# Patient Record
Sex: FEMALE | Race: BLACK OR AFRICAN AMERICAN | NOT HISPANIC OR LATINO | Employment: UNEMPLOYED | URBAN - METROPOLITAN AREA
[De-identification: names, ages, dates, MRNs, and addresses within clinical notes are randomized per-mention and may not be internally consistent; named-entity substitution may affect disease eponyms.]

---

## 2024-08-23 ENCOUNTER — APPOINTMENT (OUTPATIENT)
Dept: RADIOLOGY | Facility: HOSPITAL | Age: 69
End: 2024-08-23
Payer: COMMERCIAL

## 2024-08-23 ENCOUNTER — HOSPITAL ENCOUNTER (INPATIENT)
Facility: HOSPITAL | Age: 69
LOS: 2 days | Discharge: HOME | End: 2024-08-25
Attending: EMERGENCY MEDICINE | Admitting: STUDENT IN AN ORGANIZED HEALTH CARE EDUCATION/TRAINING PROGRAM
Payer: COMMERCIAL

## 2024-08-23 ENCOUNTER — CLINICAL SUPPORT (OUTPATIENT)
Dept: EMERGENCY MEDICINE | Facility: HOSPITAL | Age: 69
End: 2024-08-23
Payer: COMMERCIAL

## 2024-08-23 DIAGNOSIS — B96.89 BACTERIAL CONJUNCTIVITIS OF BOTH EYES: ICD-10-CM

## 2024-08-23 DIAGNOSIS — I50.9 ACUTE ON CHRONIC CONGESTIVE HEART FAILURE, UNSPECIFIED HEART FAILURE TYPE (MULTI): Primary | ICD-10-CM

## 2024-08-23 DIAGNOSIS — H10.9 BACTERIAL CONJUNCTIVITIS OF BOTH EYES: ICD-10-CM

## 2024-08-23 LAB
ALBUMIN SERPL BCP-MCNC: 2.7 G/DL (ref 3.4–5)
ALBUMIN SERPL BCP-MCNC: 2.8 G/DL (ref 3.4–5)
ALP SERPL-CCNC: 132 U/L (ref 33–136)
ALT SERPL W P-5'-P-CCNC: 15 U/L (ref 7–45)
ANION GAP SERPL CALC-SCNC: 15 MMOL/L (ref 10–20)
ANION GAP SERPL CALC-SCNC: 17 MMOL/L (ref 10–20)
AST SERPL W P-5'-P-CCNC: 22 U/L (ref 9–39)
ATRIAL RATE: 94 BPM
BASOPHILS # BLD AUTO: 0.02 X10*3/UL (ref 0–0.1)
BASOPHILS NFR BLD AUTO: 0.3 %
BILIRUB SERPL-MCNC: 1.1 MG/DL (ref 0–1.2)
BNP SERPL-MCNC: 535 PG/ML (ref 0–99)
BUN SERPL-MCNC: 12 MG/DL (ref 6–23)
BUN SERPL-MCNC: 12 MG/DL (ref 6–23)
BURR CELLS BLD QL SMEAR: NORMAL
CALCIUM SERPL-MCNC: 8.7 MG/DL (ref 8.6–10.6)
CALCIUM SERPL-MCNC: 8.9 MG/DL (ref 8.6–10.6)
CARDIAC TROPONIN I PNL SERPL HS: 173 NG/L (ref 0–34)
CARDIAC TROPONIN I PNL SERPL HS: 195 NG/L (ref 0–34)
CHLORIDE SERPL-SCNC: 96 MMOL/L (ref 98–107)
CHLORIDE SERPL-SCNC: 96 MMOL/L (ref 98–107)
CO2 SERPL-SCNC: 30 MMOL/L (ref 21–32)
CO2 SERPL-SCNC: 32 MMOL/L (ref 21–32)
CREAT SERPL-MCNC: 0.55 MG/DL (ref 0.5–1.05)
CREAT SERPL-MCNC: 0.6 MG/DL (ref 0.5–1.05)
EGFRCR SERPLBLD CKD-EPI 2021: >90 ML/MIN/1.73M*2
EGFRCR SERPLBLD CKD-EPI 2021: >90 ML/MIN/1.73M*2
EOSINOPHIL # BLD AUTO: 0.05 X10*3/UL (ref 0–0.7)
EOSINOPHIL NFR BLD AUTO: 0.8 %
ERYTHROCYTE [DISTWIDTH] IN BLOOD BY AUTOMATED COUNT: 16.4 % (ref 11.5–14.5)
GLUCOSE BLD MANUAL STRIP-MCNC: 165 MG/DL (ref 74–99)
GLUCOSE SERPL-MCNC: 134 MG/DL (ref 74–99)
GLUCOSE SERPL-MCNC: 193 MG/DL (ref 74–99)
HCT VFR BLD AUTO: 51.1 % (ref 36–46)
HGB BLD-MCNC: 17.4 G/DL (ref 12–16)
IMM GRANULOCYTES # BLD AUTO: 0.02 X10*3/UL (ref 0–0.7)
IMM GRANULOCYTES NFR BLD AUTO: 0.3 % (ref 0–0.9)
LYMPHOCYTES # BLD AUTO: 0.91 X10*3/UL (ref 1.2–4.8)
LYMPHOCYTES NFR BLD AUTO: 14.4 %
MAGNESIUM SERPL-MCNC: 1.52 MG/DL (ref 1.6–2.4)
MAGNESIUM SERPL-MCNC: 1.57 MG/DL (ref 1.6–2.4)
MCH RBC QN AUTO: 28.9 PG (ref 26–34)
MCHC RBC AUTO-ENTMCNC: 34.1 G/DL (ref 32–36)
MCV RBC AUTO: 85 FL (ref 80–100)
MONOCYTES # BLD AUTO: 1.01 X10*3/UL (ref 0.1–1)
MONOCYTES NFR BLD AUTO: 16 %
NEUTROPHILS # BLD AUTO: 4.29 X10*3/UL (ref 1.2–7.7)
NEUTROPHILS NFR BLD AUTO: 68.2 %
NRBC BLD-RTO: 0 /100 WBCS (ref 0–0)
P AXIS: 54 DEGREES
P OFFSET: 201 MS
P ONSET: 130 MS
PHOSPHATE SERPL-MCNC: 4.2 MG/DL (ref 2.5–4.9)
PLATELET # BLD AUTO: 194 X10*3/UL (ref 150–450)
POTASSIUM SERPL-SCNC: 3.4 MMOL/L (ref 3.5–5.3)
POTASSIUM SERPL-SCNC: 3.7 MMOL/L (ref 3.5–5.3)
PR INTERVAL: 174 MS
PROCALCITONIN SERPL-MCNC: 0.07 NG/ML
PROT SERPL-MCNC: 6.1 G/DL (ref 6.4–8.2)
Q ONSET: 217 MS
QRS COUNT: 16 BEATS
QRS DURATION: 114 MS
QT INTERVAL: 410 MS
QTC CALCULATION(BAZETT): 512 MS
QTC FREDERICIA: 476 MS
R AXIS: -35 DEGREES
RBC # BLD AUTO: 6.02 X10*6/UL (ref 4–5.2)
RBC MORPH BLD: NORMAL
SARS-COV-2 RNA RESP QL NAA+PROBE: NOT DETECTED
SODIUM SERPL-SCNC: 138 MMOL/L (ref 136–145)
SODIUM SERPL-SCNC: 141 MMOL/L (ref 136–145)
T AXIS: 105 DEGREES
T OFFSET: 422 MS
UFH PPP CHRO-ACNC: 1.2 IU/ML
VENTRICULAR RATE: 94 BPM
WBC # BLD AUTO: 6.3 X10*3/UL (ref 4.4–11.3)

## 2024-08-23 PROCEDURE — 2500000001 HC RX 250 WO HCPCS SELF ADMINISTERED DRUGS (ALT 637 FOR MEDICARE OP)

## 2024-08-23 PROCEDURE — 83735 ASSAY OF MAGNESIUM: CPT | Performed by: NURSE PRACTITIONER

## 2024-08-23 PROCEDURE — 80053 COMPREHEN METABOLIC PANEL: CPT

## 2024-08-23 PROCEDURE — 2500000004 HC RX 250 GENERAL PHARMACY W/ HCPCS (ALT 636 FOR OP/ED): Mod: SE

## 2024-08-23 PROCEDURE — 83735 ASSAY OF MAGNESIUM: CPT

## 2024-08-23 PROCEDURE — 87632 RESP VIRUS 6-11 TARGETS: CPT

## 2024-08-23 PROCEDURE — 80069 RENAL FUNCTION PANEL: CPT | Mod: CCI | Performed by: NURSE PRACTITIONER

## 2024-08-23 PROCEDURE — 83880 ASSAY OF NATRIURETIC PEPTIDE: CPT

## 2024-08-23 PROCEDURE — 36415 COLL VENOUS BLD VENIPUNCTURE: CPT | Performed by: EMERGENCY MEDICINE

## 2024-08-23 PROCEDURE — 99285 EMERGENCY DEPT VISIT HI MDM: CPT | Performed by: EMERGENCY MEDICINE

## 2024-08-23 PROCEDURE — 85025 COMPLETE CBC W/AUTO DIFF WBC: CPT

## 2024-08-23 PROCEDURE — 2500000002 HC RX 250 W HCPCS SELF ADMINISTERED DRUGS (ALT 637 FOR MEDICARE OP, ALT 636 FOR OP/ED)

## 2024-08-23 PROCEDURE — 71046 X-RAY EXAM CHEST 2 VIEWS: CPT

## 2024-08-23 PROCEDURE — 87635 SARS-COV-2 COVID-19 AMP PRB: CPT

## 2024-08-23 PROCEDURE — 99285 EMERGENCY DEPT VISIT HI MDM: CPT | Mod: 25

## 2024-08-23 PROCEDURE — 99223 1ST HOSP IP/OBS HIGH 75: CPT

## 2024-08-23 PROCEDURE — 84484 ASSAY OF TROPONIN QUANT: CPT | Performed by: EMERGENCY MEDICINE

## 2024-08-23 PROCEDURE — 2500000004 HC RX 250 GENERAL PHARMACY W/ HCPCS (ALT 636 FOR OP/ED)

## 2024-08-23 PROCEDURE — 96375 TX/PRO/DX INJ NEW DRUG ADDON: CPT

## 2024-08-23 PROCEDURE — 85520 HEPARIN ASSAY: CPT

## 2024-08-23 PROCEDURE — 84145 PROCALCITONIN (PCT): CPT

## 2024-08-23 PROCEDURE — 96374 THER/PROPH/DIAG INJ IV PUSH: CPT

## 2024-08-23 PROCEDURE — 2500000001 HC RX 250 WO HCPCS SELF ADMINISTERED DRUGS (ALT 637 FOR MEDICARE OP): Mod: SE

## 2024-08-23 PROCEDURE — 1200000002 HC GENERAL ROOM WITH TELEMETRY DAILY

## 2024-08-23 PROCEDURE — 93005 ELECTROCARDIOGRAM TRACING: CPT

## 2024-08-23 PROCEDURE — 82947 ASSAY GLUCOSE BLOOD QUANT: CPT

## 2024-08-23 PROCEDURE — 84484 ASSAY OF TROPONIN QUANT: CPT

## 2024-08-23 PROCEDURE — 93010 ELECTROCARDIOGRAM REPORT: CPT | Performed by: EMERGENCY MEDICINE

## 2024-08-23 PROCEDURE — 36415 COLL VENOUS BLD VENIPUNCTURE: CPT

## 2024-08-23 PROCEDURE — 2500000005 HC RX 250 GENERAL PHARMACY W/O HCPCS

## 2024-08-23 PROCEDURE — 71046 X-RAY EXAM CHEST 2 VIEWS: CPT | Mod: FOREIGN READ | Performed by: RADIOLOGY

## 2024-08-23 RX ORDER — METOPROLOL SUCCINATE 25 MG/1
25 TABLET, EXTENDED RELEASE ORAL DAILY
Status: DISCONTINUED | OUTPATIENT
Start: 2024-08-23 | End: 2024-08-24

## 2024-08-23 RX ORDER — ASPIRIN 325 MG
325 TABLET ORAL ONCE
Status: COMPLETED | OUTPATIENT
Start: 2024-08-23 | End: 2024-08-23

## 2024-08-23 RX ORDER — HEPARIN SODIUM 10000 [USP'U]/100ML
0-4500 INJECTION, SOLUTION INTRAVENOUS CONTINUOUS
Status: DISCONTINUED | OUTPATIENT
Start: 2024-08-23 | End: 2024-08-24

## 2024-08-23 RX ORDER — POLYETHYLENE GLYCOL 3350 17 G/17G
17 POWDER, FOR SOLUTION ORAL DAILY
Status: DISCONTINUED | OUTPATIENT
Start: 2024-08-23 | End: 2024-08-25 | Stop reason: HOSPADM

## 2024-08-23 RX ORDER — ACETAMINOPHEN 325 MG/1
650 TABLET ORAL EVERY 6 HOURS PRN
Status: DISCONTINUED | OUTPATIENT
Start: 2024-08-23 | End: 2024-08-25 | Stop reason: HOSPADM

## 2024-08-23 RX ORDER — DAPAGLIFLOZIN 10 MG/1
10 TABLET, FILM COATED ORAL DAILY
Status: DISCONTINUED | OUTPATIENT
Start: 2024-08-23 | End: 2024-08-25 | Stop reason: HOSPADM

## 2024-08-23 RX ORDER — GUAIFENESIN 600 MG/1
600 TABLET, EXTENDED RELEASE ORAL 2 TIMES DAILY PRN
Status: DISCONTINUED | OUTPATIENT
Start: 2024-08-23 | End: 2024-08-25 | Stop reason: HOSPADM

## 2024-08-23 RX ORDER — MOXIFLOXACIN 5 MG/ML
1 SOLUTION/ DROPS OPHTHALMIC 3 TIMES DAILY
Status: DISCONTINUED | OUTPATIENT
Start: 2024-08-23 | End: 2024-08-24

## 2024-08-23 RX ORDER — DIGOXIN 125 MCG
125 TABLET ORAL DAILY
Status: DISCONTINUED | OUTPATIENT
Start: 2024-08-23 | End: 2024-08-25 | Stop reason: HOSPADM

## 2024-08-23 RX ORDER — NYSTATIN 100000 [USP'U]/G
1 POWDER TOPICAL ONCE
Status: COMPLETED | OUTPATIENT
Start: 2024-08-23 | End: 2024-08-24

## 2024-08-23 RX ORDER — INSULIN LISPRO 100 [IU]/ML
0-5 INJECTION, SOLUTION INTRAVENOUS; SUBCUTANEOUS
Status: DISCONTINUED | OUTPATIENT
Start: 2024-08-23 | End: 2024-08-25 | Stop reason: HOSPADM

## 2024-08-23 RX ORDER — POTASSIUM CHLORIDE 14.9 MG/ML
20 INJECTION INTRAVENOUS
Status: DISPENSED | OUTPATIENT
Start: 2024-08-23 | End: 2024-08-24

## 2024-08-23 RX ORDER — MAGNESIUM SULFATE HEPTAHYDRATE 40 MG/ML
2 INJECTION, SOLUTION INTRAVENOUS ONCE
Status: COMPLETED | OUTPATIENT
Start: 2024-08-23 | End: 2024-08-23

## 2024-08-23 RX ORDER — FUROSEMIDE 10 MG/ML
40 INJECTION INTRAMUSCULAR; INTRAVENOUS ONCE
Status: COMPLETED | OUTPATIENT
Start: 2024-08-23 | End: 2024-08-23

## 2024-08-23 RX ORDER — ASPIRIN 81 MG/1
81 TABLET ORAL DAILY
Status: DISCONTINUED | OUTPATIENT
Start: 2024-08-24 | End: 2024-08-25 | Stop reason: HOSPADM

## 2024-08-23 RX ORDER — POLYETHYLENE GLYCOL 3350 17 G/17G
17 POWDER, FOR SOLUTION ORAL DAILY PRN
Status: DISCONTINUED | OUTPATIENT
Start: 2024-08-23 | End: 2024-08-25 | Stop reason: HOSPADM

## 2024-08-23 RX ORDER — SPIRONOLACTONE 25 MG/1
25 TABLET ORAL DAILY
Status: DISCONTINUED | OUTPATIENT
Start: 2024-08-23 | End: 2024-08-25 | Stop reason: HOSPADM

## 2024-08-23 RX ORDER — MAGNESIUM SULFATE HEPTAHYDRATE 40 MG/ML
2 INJECTION, SOLUTION INTRAVENOUS ONCE
Status: COMPLETED | OUTPATIENT
Start: 2024-08-23 | End: 2024-08-24

## 2024-08-23 RX ORDER — ATORVASTATIN CALCIUM 40 MG/1
40 TABLET, FILM COATED ORAL NIGHTLY
Status: DISCONTINUED | OUTPATIENT
Start: 2024-08-23 | End: 2024-08-25 | Stop reason: HOSPADM

## 2024-08-23 RX ORDER — MOXIFLOXACIN 5 MG/ML
1 SOLUTION/ DROPS OPHTHALMIC 3 TIMES DAILY
Status: DISCONTINUED | OUTPATIENT
Start: 2024-08-23 | End: 2024-08-23

## 2024-08-23 SDOH — SOCIAL STABILITY: SOCIAL INSECURITY: ARE YOU OR HAVE YOU BEEN THREATENED OR ABUSED PHYSICALLY, EMOTIONALLY, OR SEXUALLY BY ANYONE?: NO

## 2024-08-23 SDOH — SOCIAL STABILITY: SOCIAL INSECURITY: ABUSE: ADULT

## 2024-08-23 SDOH — SOCIAL STABILITY: SOCIAL INSECURITY: DO YOU FEEL ANYONE HAS EXPLOITED OR TAKEN ADVANTAGE OF YOU FINANCIALLY OR OF YOUR PERSONAL PROPERTY?: NO

## 2024-08-23 SDOH — SOCIAL STABILITY: SOCIAL INSECURITY: DOES ANYONE TRY TO KEEP YOU FROM HAVING/CONTACTING OTHER FRIENDS OR DOING THINGS OUTSIDE YOUR HOME?: NO

## 2024-08-23 SDOH — SOCIAL STABILITY: SOCIAL INSECURITY: HAS ANYONE EVER THREATENED TO HURT YOUR FAMILY OR YOUR PETS?: NO

## 2024-08-23 SDOH — SOCIAL STABILITY: SOCIAL INSECURITY: HAVE YOU HAD THOUGHTS OF HARMING ANYONE ELSE?: NO

## 2024-08-23 SDOH — SOCIAL STABILITY: SOCIAL INSECURITY: HAVE YOU HAD ANY THOUGHTS OF HARMING ANYONE ELSE?: NO

## 2024-08-23 SDOH — SOCIAL STABILITY: SOCIAL INSECURITY: WERE YOU ABLE TO COMPLETE ALL THE BEHAVIORAL HEALTH SCREENINGS?: YES

## 2024-08-23 SDOH — SOCIAL STABILITY: SOCIAL INSECURITY: ARE THERE ANY APPARENT SIGNS OF INJURIES/BEHAVIORS THAT COULD BE RELATED TO ABUSE/NEGLECT?: NO

## 2024-08-23 SDOH — SOCIAL STABILITY: SOCIAL INSECURITY: DO YOU FEEL UNSAFE GOING BACK TO THE PLACE WHERE YOU ARE LIVING?: NO

## 2024-08-23 ASSESSMENT — PATIENT HEALTH QUESTIONNAIRE - PHQ9
2. FEELING DOWN, DEPRESSED OR HOPELESS: NOT AT ALL
SUM OF ALL RESPONSES TO PHQ9 QUESTIONS 1 & 2: 0
1. LITTLE INTEREST OR PLEASURE IN DOING THINGS: NOT AT ALL

## 2024-08-23 ASSESSMENT — PAIN DESCRIPTION - PAIN TYPE
TYPE: ACUTE PAIN
TYPE: ACUTE PAIN

## 2024-08-23 ASSESSMENT — COGNITIVE AND FUNCTIONAL STATUS - GENERAL
MOVING TO AND FROM BED TO CHAIR: A LITTLE
CLIMB 3 TO 5 STEPS WITH RAILING: TOTAL
CLIMB 3 TO 5 STEPS WITH RAILING: TOTAL
MOVING FROM LYING ON BACK TO SITTING ON SIDE OF FLAT BED WITH BEDRAILS: A LITTLE
MOVING TO AND FROM BED TO CHAIR: A LITTLE
TURNING FROM BACK TO SIDE WHILE IN FLAT BAD: A LITTLE
DAILY ACTIVITIY SCORE: 19
TURNING FROM BACK TO SIDE WHILE IN FLAT BAD: A LITTLE
PERSONAL GROOMING: A LITTLE
MOVING TO AND FROM BED TO CHAIR: A LITTLE
TURNING FROM BACK TO SIDE WHILE IN FLAT BAD: A LITTLE
WALKING IN HOSPITAL ROOM: A LOT
MOBILITY SCORE: 14
DRESSING REGULAR UPPER BODY CLOTHING: A LITTLE
TOILETING: A LITTLE
CLIMB 3 TO 5 STEPS WITH RAILING: TOTAL
STANDING UP FROM CHAIR USING ARMS: A LOT
MOBILITY SCORE: 14
MOBILITY SCORE: 14
DRESSING REGULAR LOWER BODY CLOTHING: A LITTLE
HELP NEEDED FOR BATHING: A LITTLE
PERSONAL GROOMING: A LITTLE
MOVING FROM LYING ON BACK TO SITTING ON SIDE OF FLAT BED WITH BEDRAILS: A LITTLE
HELP NEEDED FOR BATHING: A LITTLE
PATIENT BASELINE BEDBOUND: NO
WALKING IN HOSPITAL ROOM: A LOT
STANDING UP FROM CHAIR USING ARMS: A LOT
TOILETING: A LITTLE
MOVING FROM LYING ON BACK TO SITTING ON SIDE OF FLAT BED WITH BEDRAILS: A LITTLE
STANDING UP FROM CHAIR USING ARMS: A LOT
DRESSING REGULAR UPPER BODY CLOTHING: A LITTLE
DRESSING REGULAR LOWER BODY CLOTHING: A LITTLE
WALKING IN HOSPITAL ROOM: A LOT
DAILY ACTIVITIY SCORE: 19

## 2024-08-23 ASSESSMENT — LIFESTYLE VARIABLES
SKIP TO QUESTIONS 9-10: 1
HOW OFTEN DO YOU HAVE A DRINK CONTAINING ALCOHOL: NEVER
AUDIT-C TOTAL SCORE: 0
HOW OFTEN DO YOU HAVE 6 OR MORE DRINKS ON ONE OCCASION: NEVER
AUDIT-C TOTAL SCORE: 0
HOW MANY STANDARD DRINKS CONTAINING ALCOHOL DO YOU HAVE ON A TYPICAL DAY: PATIENT DOES NOT DRINK

## 2024-08-23 ASSESSMENT — ACTIVITIES OF DAILY LIVING (ADL)
BATHING: NEEDS ASSISTANCE
LACK_OF_TRANSPORTATION: NO
HEARING - RIGHT EAR: FUNCTIONAL
WALKS IN HOME: NEEDS ASSISTANCE
JUDGMENT_ADEQUATE_SAFELY_COMPLETE_DAILY_ACTIVITIES: YES
GROOMING: NEEDS ASSISTANCE
HEARING - LEFT EAR: FUNCTIONAL
PATIENT'S MEMORY ADEQUATE TO SAFELY COMPLETE DAILY ACTIVITIES?: YES
DRESSING YOURSELF: NEEDS ASSISTANCE
ADEQUATE_TO_COMPLETE_ADL: YES
TOILETING: NEEDS ASSISTANCE
FEEDING YOURSELF: INDEPENDENT

## 2024-08-23 ASSESSMENT — PAIN - FUNCTIONAL ASSESSMENT
PAIN_FUNCTIONAL_ASSESSMENT: 0-10

## 2024-08-23 ASSESSMENT — PAIN SCALES - GENERAL
PAINLEVEL_OUTOF10: 5 - MODERATE PAIN
PAINLEVEL_OUTOF10: 5 - MODERATE PAIN
PAINLEVEL_OUTOF10: 0 - NO PAIN

## 2024-08-23 ASSESSMENT — COLUMBIA-SUICIDE SEVERITY RATING SCALE - C-SSRS
2. HAVE YOU ACTUALLY HAD ANY THOUGHTS OF KILLING YOURSELF?: NO
6. HAVE YOU EVER DONE ANYTHING, STARTED TO DO ANYTHING, OR PREPARED TO DO ANYTHING TO END YOUR LIFE?: NO
1. IN THE PAST MONTH, HAVE YOU WISHED YOU WERE DEAD OR WISHED YOU COULD GO TO SLEEP AND NOT WAKE UP?: NO

## 2024-08-23 ASSESSMENT — PAIN DESCRIPTION - DESCRIPTORS: DESCRIPTORS: ACHING

## 2024-08-23 ASSESSMENT — PAIN DESCRIPTION - LOCATION: LOCATION: EYE

## 2024-08-23 NOTE — ED TRIAGE NOTES
Patient presented to the ed with complaints of reddness, swelling, and pain in bilateral eyes.  The patient states that this has been ongoing for 3 days or more.  She presented with moderate amount of discharge from bilateral conjunctiva.  Pt states having chest congestion with shortness of breath, and a productive cough, but unable to endorse color or amount of sputum.

## 2024-08-23 NOTE — ED PROVIDER NOTES
Emergency Department Provider Note        History of Present Illness     History provided by: Patient  Limitations to History: None  External Records Reviewed with Brief Summary: None    HPI:  Eloy Li is a 69 y.o. female presenting with bilateral eye pain and shortness of breath with new cough for the past 3 days. Medical history of type 2 diabetes on insulin, hypertension, chronic systolic heart failure, CHF, EF 25-30% on SALMA 10/31/22 . Patient states eyes both started turning red 3 days ago, it is painful when she looks into bright lights with occasional blurriness. Patient does not wear contact lenses or eyeglasses, no previous eye history.      Physical Exam   Triage vitals:  T 36 °C (96.8 °F)  HR 98  BP (!) 149/104  RR 16  O2 94 % None (Room air)    General: Awake, alert, in no acute distress  Eyes: Bilateral conjunctivitis with mucopurulent drainage.  Significantly injected sclera.  Fissures beneath eyes.  No periorbital edema.  EOMI.  PERRLA.  HENT: Normo-cephalic, atraumatic. No stridor  CV: Regular rate, regular rhythm. Radial pulses 2+ bilaterally  Resp: Productive cough.  Bilateral wheezing. Decreased lung sounds in bases.  Breathing non-labored, speaking in full sentences.   GI: Soft, non-distended, non-tender. No rebound or guarding.  MSK/Extremities: 1+ pitting edema bilaterally.  No gross bony deformities. Moving all extremities  Skin: Warm. Appropriate color  Neuro: Alert. Oriented. Face symmetric. Speech is fluent. Gross strength and sensation intact in b/l UE and LEs  Psych: Appropriate mood and affect    Medical Decision Making & ED Course   Medical Decision Makin y.o. female with bacterial conjunctivitis and dyspnea.  Triage vital signs and previous hospital records reviewed.  Patient is afebrile not tachycardic. Significant eye findings noted above and physical exam consistent with bacterial conjunctivitis. Due to significant scleral injection and painful blurry vision,  ophthalmology was consulted for further evaluation and recommended moxifloxacin. Differential includes but is not limited to worsening CHF, infection including pneumonia, COVID. Chest x-ray significant for prominence of the central pulmonary vasculature, last known EF 25-30% on SALMA 10/31/22.  Initial troponin elevated to 195, patient given aspirin and repeat troponin EKG.  No chest pain.  BNP elevated to 535, Lasix 40 mg given.  Patient states that she is supposed to take 20 mg twice daily, however she has missed doses recently. Likely this is acute CHF exacerbation and elevated troponins due to demand ischemia.  Patient admitted to cardiology for further care.    Independent Result Review and Interpretation: Relevant laboratory and radiographic results were reviewed and independently interpreted by myself.  As necessary, they are commented on in the ED Course.    Chronic conditions affecting the patient's care: As documented above in [facility]    The patient was discussed with the following consultants/services: Admission Coordinator who accepted the patient for admission     Care Considerations: As documented above in [facility]    ED Course:  ED Course as of 08/23/24 1330   Fri Aug 23, 2024   1100 XR chest 2 views  prominence of the central pulmonary vasculature []   1306 MAGNESIUM(!): 1.52  Replaced []   1306 ECG 12 lead  Normal sinus rhythm rate 94.  , , QTc 512 no ST elevation or depression []      ED Course User Index  [] Tawanna Weber DO         Diagnoses as of 08/23/24 1330   Acute on chronic congestive heart failure, unspecified heart failure type (Multi)   Bacterial conjunctivitis of both eyes     Disposition   As a result of their workup, the patient will require admission to the hospital.  The patient was informed of her diagnosis. The patient was given the opportunity to ask questions and I answered them. The patient agreed to be admitted to the hospital.    Procedures   Procedures    Patient  seen and discussed with ED attending physician.    Tawanna Weber DO  Emergency Medicine     Tawanna Weber DO  Resident  08/23/24 [id number]

## 2024-08-23 NOTE — CARE PLAN
The patient's goals for the shift include  having no pain this shift.    The clinical goals for the shift include patients pain to be controlled

## 2024-08-23 NOTE — CONSULTS
Reason for consult: Eye Redness    Patient is a 70 y/o F no POH presenting for evaluation of eye redness.    She reports her eyes became itchy and red about 4-5 days ago with associated discharge that is predominantly teary with a mild mucus component. She also complains of mild blurred vision in both eyes, symmetrically. She reports that she began having viral symptoms concurrently with eye redness. Symptoms have been stable since onset. She denies trauma, foreign substance exposure, photophobia, flashes/floaters, curtaining, diplopia, CL wear.    Past Medical History: as above  Family History: reviewed and not pertinent to chief complaint  Medications: please refer to medication reconciliation  Allergies: please refer to patient allergy list    Base Eye Exam       Visual Acuity (Snellen - Linear)         Right Left    Near sc 20/70 ph 20/40 20/50-1 PHNI              Tonometry (Tonopen)         Right Left    Pressure 18 18              Pupils         Dark Light Shape React APD    Right 4 3 Round Brisk None    Left 4 3 Round Brisk None              Visual Fields         Left Right     Full Full              Extraocular Movement         Right Left     Full Full                  Slit Lamp and Fundus Exam       Slit Lamp Exam         Right Left    Lids/Lashes Lash matting, mucus along upper and lower lid margin Lash matting, mucus along upper and lower lid margin    Conjunctiva/Sclera 2+ conjunctival injection with mucus debris, inferior follicular reaction 2+ conjunctival injection with mucus debris, inferior follicular reaction    Cornea 1+ SPK 1+ SPK    Anterior Chamber Deep and quiet Deep and quiet    Iris Round and reactive Round and reactive    Lens 2-3+ NS 2-3+ NS    Anterior Vitreous Normal Normal              Fundus Exam         Right Left    Disc Sharp margins, peripapillary pigmentary changes Sharp margins, peripapillary pigmentary changes    C/D Ratio 0.55 0.55    Macula attached Large macular hole     Vessels DBH above inferior arcade DBH nasal mid periphery    Periphery Attached 360 Attached 360                    A&P:    #Conjunctivitis OU  - Exam revealing conjunctivitis OU with follicular reaction and mild-moderate mucoid discharge  - No confluent corneal staining, infiltrate or AC reaction, no SEIs or pseudomembranes  - Given URI symptoms, suspect likely viral etiology, but give mucoid discharge, reasonable to cover for mild bacterial conjunctivitis  - START ATs QID OU  - START Erythro [place] QHS OU  - START Moxifloxacin QID OU for 1 week  - Discussed contagious nature and importance of hand hygiene/washing pillowcases with patient  - Pt to be admitted to Cardiology; Ophthalmology will arrange close followup on discharge    #Macular Hole OS  - Asymptomatic, appears chronic on DFE  - Discussed with retina fellow Dr. Schumacher, will schedule close retina followup on discharge for evaluation    #DBH OU  - Suspect mild NPDR OU  - CTM at retina appt      HTL  PGY3    Note not final until signed by attending physician    Ophthalmology Adult Pager: [phone]  Ophthalmology Peds Pager: [phone]    For adult follow up appts, call [phone]  For pediatric follow up appts, call [phone]

## 2024-08-23 NOTE — H&P
"Internal Medicine Admission Note    Chief concern:   Shortness of breath, cough, LE swelling, bilateral conjunctivitis     History Of Present Illness  lEoy Li is a 69 y.o. female w/a PMH of  HFrEF (EF 25-30% 10/2022), atrial fibrillation/flutter on eliquis (unclear adherence), HTN, T2DM, tobacco use (20 pack/year history), schizophrenia that presented from home with SOB, LE swelling, cough, conjunctivitis. Her son is bedside and helps provide history along with patient.     Patient states she has felt more SOB for about one week. She cannot go up and down stairs but can ambulate around her home independently. She lives with her daughter who helps with cooking and cleaning. She also says her legs have felt more swollen for one week. She has had intermittent chest pain that she thought was heartburn, describes as burning pain that comes randomly and is not associated with exertion. The pain does radiate to her left shoulder and makes her nauseas. She has a productive cough that started one week ago and produces green sputum. She denies fevers, chills, nausea, vomiting, active CP.    Per her son, she has been dealing with increased ANDREW and LE edema for months. Her son took her to the ED due to increased SOB and conjunctivitis. Her daughter was able to help us confirm her home medications and stated she takes her meds \"most days\" but is not always adherent. She has smoked 1/2 pack cigarettes for about 40 years. She does not drink alcohol or use any other substances.     In the emergency room, HR 98 NSR, /104, afebrile and on room air. COVID negative, CXR with prominence of pulmonary vasculature, no WBC elevation, troponins 195 -> 173, EKG without c/f ischemia,  similar to past admissions. Low Mg 1.52, repleted. She was given 40 IV lasix once.       Review of Systems  As stated in HPI    History and Medications   See HPI for further history    Past Medical History  No past medical history on " file.  Surgical History  No past surgical history on file.  Family History  No family history on file.  Social History      Allergies  Penicillin  Home Medications  Previous Medications    No medications on file     Objective     Vitals: I/O:   Vitals:    08/23/24 1400   BP: (!) 140/113   Pulse: (!) 107   Resp: 15   Temp:    SpO2: 95%      24hr Min/Max:  Temp  Min: 36 °C (96.8 °F)  Max: 36 °C (96.8 °F)  Pulse  Min: 96  Max: 107  BP  Min: 140/113  Max: 149/104  Resp  Min: 15  Max: 16  SpO2  Min: 94 %  Max: 95 %   Intake/Output Summary (Last 24 hours) at 8/23/2024 1457  Last data filed at 8/23/2024 1453  Gross per 24 hour   Intake 50 ml   Output --   Net 50 ml        Net IO Since Admission: 50 mL [08/23/24 1457]      Physical Exam:  General: Awake, alert, conversant, appears stated age  HEENT: Bilateral conjunctivitis, red sclera, bilateral drainage and crust  Skin: No suspect lesions or rashes noted on visible skin  Chest: Bibasilar crackles and decreased breath sounds at BL lung bases, no wheezing, normal respiratory effort, not on supplemental oxygen  Cardiac: Regular rate and rhythm, normal s1, s2, no M/R/G  Abdomen: Soft, ND, NT, no involuntary guarding  EXT: +2 BL peripheral edema, no asymmetry noted  MSK: No focal joint swelling noted  Neuro: AOx4, moving all limbs spontaneously, follows commands  Psych: Coherent thought process, appropriate mood and affect    General Chemistry Labs  Results from last 72 hours   Lab Units 08/23/24  1048   GLUCOSE mg/dL 134*   SODIUM mmol/L 141   POTASSIUM mmol/L 3.7   CHLORIDE mmol/L 96*   CO2 mmol/L 32   BUN mg/dL 12   CREATININE mg/dL 0.60   ANION GAP mmol/L 17   EGFR mL/min/1.73m*2 >90   CALCIUM mg/dL 8.9   MAGNESIUM mg/dL 1.52*   ALBUMIN g/dL 2.8*   ALK PHOS U/L 132   ALT U/L 15   AST U/L 22   BILIRUBIN TOTAL mg/dL 1.1   PROTEIN TOTAL g/dL 6.1*      CBC  Results from last 72 hours   Lab Units 08/23/24  1048   WBC AUTO x10*3/uL 6.3   HEMOGLOBIN g/dL 17.4*   HEMATOCRIT %  "51.1*   MCV fL 85   MCH pg 28.9   MCHC g/dL 34.1   RDW % 16.4*   PLATELETS AUTO x10*3/uL 194   NEUTROS PCT AUTO % 68.2   LYMPHS PCT AUTO % 14.4   MONOS PCT AUTO % 16.0   EOS PCT AUTO % 0.8     Coagulation Labs        No lab exists for component: \"RETICHBG\"  Cardiac Labs  Results from last 72 hours   Lab Units 08/23/24  1316 08/23/24  1048   [facility] ng/L 173* 195*   BNP pg/mL  --  535*     Computed MELD 3.0 unavailable. One or more values for this score either were not found within the given timeframe or did not fit some other criterion.  Computed MELD-Na unavailable. One or more values for this score either were not found within the given timeframe or did not fit some other criterion.    Micro/ID:   No results found for: \"URINECULTURE\", \"BLOODCULT\", \"CSFCULTSMEAR\"      Summary of key imaging results from the last 24 hours:  CXR 8/23:  IMPRESSION:  Cardiomegaly.  Mild prominence of central pulmonary vasculature.  Mild  chronic changes without acute process.    Current Inpatient Medications     Scheduled Medications:  PRN Medications:  Continuous Medications:   heparin, 80 Units/kg, intravenous, Once  moxifloxacin, 1 drop, Both Eyes, TID  polyethylene glycol, 17 g, oral, Daily     PRN medications: heparin heparin, 0-4,500 Units/hr         Assessment and Plan   Eloy Li is a 69 y.o. female w/ a PMH of HFrEF (EF 25-30% 10/2022), atrial fibrillation/flutter on eliquis (unclear adherence), HTN, T2DM, tobacco use (20 pack/year history), schizophrenia that presented from home with SOB, LE swelling, cough, conjunctivitis. In the ED, CXR w/ pulmonary vascular congestion, appears to have LE edema and bibasilar crackles, BNP elevated at 535. Last TTE is from two years ago with reduced EF which makes HF exacerbation likely. She has optimal GDMT prescribed but is reported to miss doses at home. She does not have a diagnosis of COPD, prior imaging does not show emphysema, no wheezing on exam, but has strong smoking " history and productive cough with increased SOB x1 week making COPD exacerbation also a possibility. Her cough and conjunctivitis are likely viral, she is afebrile with normal WBC and no focal findings on CXR concerning for pneumonia.     #HFrEF (EF 25-30% 10/2022)  #HTN  #CAD  :: Home meds include   - metoprolol succinate 50 mg   - entresto 49/51 mg    - Farxiga 10 mg   - spironolactone 25 mg   - torsemide 20 mg  :: s/p 40 IV lasix x1 in ED  :: [facility] 12/2018 nonobstructive CAD  PLAN:  - Obtain TTE  - assess response to lasix from ED and re-dose if needed for goal net neg 2-2.5 L/day  - decrease metoprolol to 25 mg succinate while inpatient  - Continue home GDMT including farxiga, entresto, spironolactone  - BID RFP while diuresing   - Continue home aspirin and atorvastatin     #URI  #Conjunctivitis  :: most likely viral as it is bilateral  :: productive cough x1 week, no fevers/chills/nausea/vomiting/diarrhea   :: COVID negative  ::CXR not concerning for obvious PNA  PLAN:  - no indication for antibiotics at this time  - Checking viral panel, flu, RSV, sputum culture  - mucinex     #Atrial fibrillation/flutter  :: Hx cardioversion 10/2023  PLAN:  - continue home digoxin 125 mcg  - Unclear adherence to eliquis. Will do heparin gtt inpatient     #T2DM  - SSI while inpatient       Medical Check List   FEN  -Fluids: Cautious until TTE  -Electrolytes: Will replete PRN, with goals of Mg >2, K>4  -Nutrition: Low sodium diet  Prophylaxis:  -DVT ppx: Heparin gtt for afib  -GI ppx/Bowel care: miralax PRN  -Abx: N/A  -Pain regimen: Tylenol PRN  Hardware:  -Lines: PIV  Social:  -Code: Full Code  -NOK/Surrogate Decision Maker: Son Ambrsoe  and daughter elodia     To be staffed with Dr. Kodi Gamez MD   08/23/24 at 2:57 PM   Categorical PGY-1 Internal Medicine     Disclaimer: Documentation completed with the information available at the time of input. The times in the chart may not be reflective  of actual patient care times, interventions, or procedures. Documentation occurs after the physical care of the patient.

## 2024-08-24 ENCOUNTER — APPOINTMENT (OUTPATIENT)
Dept: CARDIOLOGY | Facility: HOSPITAL | Age: 69
End: 2024-08-24
Payer: COMMERCIAL

## 2024-08-24 LAB
ADENOVIRUS RVP, VIRC: NOT DETECTED
ALBUMIN SERPL BCP-MCNC: 2.6 G/DL (ref 3.4–5)
ANION GAP SERPL CALC-SCNC: 14 MMOL/L (ref 10–20)
AORTIC VALVE PEAK VELOCITY: 1.14 M/S
AV PEAK GRADIENT: 5.2 MMHG
AVA (PEAK VEL): 1.66 CM2
BUN SERPL-MCNC: 13 MG/DL (ref 6–23)
CALCIUM SERPL-MCNC: 8.5 MG/DL (ref 8.6–10.6)
CHLORIDE SERPL-SCNC: 98 MMOL/L (ref 98–107)
CO2 SERPL-SCNC: 30 MMOL/L (ref 21–32)
CREAT SERPL-MCNC: 0.55 MG/DL (ref 0.5–1.05)
EGFRCR SERPLBLD CKD-EPI 2021: >90 ML/MIN/1.73M*2
EJECTION FRACTION APICAL 4 CHAMBER: 27.6
EJECTION FRACTION: 33 %
ENTEROVIRUS/RHINOVIRUS RVP, VIRC: NOT DETECTED
ERYTHROCYTE [DISTWIDTH] IN BLOOD BY AUTOMATED COUNT: 16.6 % (ref 11.5–14.5)
GLUCOSE BLD MANUAL STRIP-MCNC: 170 MG/DL (ref 74–99)
GLUCOSE BLD MANUAL STRIP-MCNC: 183 MG/DL (ref 74–99)
GLUCOSE BLD MANUAL STRIP-MCNC: 186 MG/DL (ref 74–99)
GLUCOSE BLD MANUAL STRIP-MCNC: 215 MG/DL (ref 74–99)
GLUCOSE SERPL-MCNC: 162 MG/DL (ref 74–99)
HCT VFR BLD AUTO: 53.8 % (ref 36–46)
HGB BLD-MCNC: 16.9 G/DL (ref 12–16)
HUMAN BOCAVIRUS RVP, VIRC: NOT DETECTED
HUMAN CORONAVIRUS RVP, VIRC: NOT DETECTED
INFLUENZA A , VIRC: NOT DETECTED
INFLUENZA A H1N1-09 , VIRC: NOT DETECTED
INFLUENZA B PCR, VIRC: NOT DETECTED
LEFT ATRIUM VOLUME AREA LENGTH INDEX BSA: 46.8 ML/M2
LEFT VENTRICLE INTERNAL DIMENSION DIASTOLE: 5.65 CM (ref 3.5–6)
LEFT VENTRICULAR OUTFLOW TRACT DIAMETER: 1.8 CM
MAGNESIUM SERPL-MCNC: 2.01 MG/DL (ref 1.6–2.4)
MCH RBC QN AUTO: 28.8 PG (ref 26–34)
MCHC RBC AUTO-ENTMCNC: 31.4 G/DL (ref 32–36)
MCV RBC AUTO: 92 FL (ref 80–100)
METAPNEUMOVIRUS , VIRC: NOT DETECTED
MITRAL VALVE E/A RATIO: 2.74
NRBC BLD-RTO: 0 /100 WBCS (ref 0–0)
PARAINFLUENZA PCR, VIRC: NOT DETECTED
PHOSPHATE SERPL-MCNC: 3.7 MG/DL (ref 2.5–4.9)
PLATELET # BLD AUTO: 193 X10*3/UL (ref 150–450)
POTASSIUM SERPL-SCNC: 3.9 MMOL/L (ref 3.5–5.3)
RBC # BLD AUTO: 5.87 X10*6/UL (ref 4–5.2)
RIGHT VENTRICLE FREE WALL PEAK S': 7.36 CM/S
RIGHT VENTRICLE PEAK SYSTOLIC PRESSURE: 58.1 MMHG
RSV PCR, RVP, VIRC: NOT DETECTED
SODIUM SERPL-SCNC: 138 MMOL/L (ref 136–145)
TRICUSPID ANNULAR PLANE SYSTOLIC EXCURSION: 1.3 CM
UFH PPP CHRO-ACNC: 0.6 IU/ML
UFH PPP CHRO-ACNC: 0.9 IU/ML
WBC # BLD AUTO: 7 X10*3/UL (ref 4.4–11.3)

## 2024-08-24 PROCEDURE — 80069 RENAL FUNCTION PANEL: CPT

## 2024-08-24 PROCEDURE — 36415 COLL VENOUS BLD VENIPUNCTURE: CPT

## 2024-08-24 PROCEDURE — 99233 SBSQ HOSP IP/OBS HIGH 50: CPT

## 2024-08-24 PROCEDURE — 2500000001 HC RX 250 WO HCPCS SELF ADMINISTERED DRUGS (ALT 637 FOR MEDICARE OP)

## 2024-08-24 PROCEDURE — 83735 ASSAY OF MAGNESIUM: CPT

## 2024-08-24 PROCEDURE — 2500000002 HC RX 250 W HCPCS SELF ADMINISTERED DRUGS (ALT 637 FOR MEDICARE OP, ALT 636 FOR OP/ED)

## 2024-08-24 PROCEDURE — 85520 HEPARIN ASSAY: CPT

## 2024-08-24 PROCEDURE — 2500000005 HC RX 250 GENERAL PHARMACY W/O HCPCS

## 2024-08-24 PROCEDURE — 82947 ASSAY GLUCOSE BLOOD QUANT: CPT

## 2024-08-24 PROCEDURE — 93306 TTE W/DOPPLER COMPLETE: CPT

## 2024-08-24 PROCEDURE — 93306 TTE W/DOPPLER COMPLETE: CPT | Performed by: INTERNAL MEDICINE

## 2024-08-24 PROCEDURE — 85027 COMPLETE CBC AUTOMATED: CPT

## 2024-08-24 PROCEDURE — 2500000004 HC RX 250 GENERAL PHARMACY W/ HCPCS (ALT 636 FOR OP/ED)

## 2024-08-24 PROCEDURE — 1200000002 HC GENERAL ROOM WITH TELEMETRY DAILY

## 2024-08-24 RX ORDER — DIGOXIN 125 MCG
1 TABLET ORAL DAILY
COMMUNITY
Start: 2022-11-01

## 2024-08-24 RX ORDER — TORSEMIDE 20 MG/1
20 TABLET ORAL
Status: DISCONTINUED | OUTPATIENT
Start: 2024-08-26 | End: 2024-08-24

## 2024-08-24 RX ORDER — METOPROLOL SUCCINATE 50 MG/1
50 TABLET, EXTENDED RELEASE ORAL
COMMUNITY
Start: 2023-10-17

## 2024-08-24 RX ORDER — ATORVASTATIN CALCIUM 80 MG/1
0.5 TABLET, FILM COATED ORAL DAILY
COMMUNITY

## 2024-08-24 RX ORDER — ERYTHROMYCIN 5 MG/G
1 OINTMENT OPHTHALMIC NIGHTLY
Status: DISCONTINUED | OUTPATIENT
Start: 2024-08-24 | End: 2024-08-25 | Stop reason: HOSPADM

## 2024-08-24 RX ORDER — DAPAGLIFLOZIN 10 MG/1
1 TABLET, FILM COATED ORAL DAILY
COMMUNITY
Start: 2022-11-01

## 2024-08-24 RX ORDER — METOPROLOL SUCCINATE 50 MG/1
50 TABLET, EXTENDED RELEASE ORAL DAILY
Status: DISCONTINUED | OUTPATIENT
Start: 2024-08-25 | End: 2024-08-25 | Stop reason: HOSPADM

## 2024-08-24 RX ORDER — TORSEMIDE 20 MG/1
20 TABLET ORAL 2 TIMES DAILY
COMMUNITY
Start: 2023-10-30

## 2024-08-24 RX ORDER — MUPIROCIN 20 MG/G
OINTMENT TOPICAL 3 TIMES DAILY
Status: DISCONTINUED | OUTPATIENT
Start: 2024-08-24 | End: 2024-08-24

## 2024-08-24 RX ORDER — TORSEMIDE 20 MG/1
20 TABLET ORAL
Status: DISCONTINUED | OUTPATIENT
Start: 2024-08-24 | End: 2024-08-25 | Stop reason: HOSPADM

## 2024-08-24 RX ORDER — SPIRONOLACTONE 25 MG/1
25 TABLET ORAL
COMMUNITY
Start: 2023-10-17 | End: 2024-10-16

## 2024-08-24 RX ORDER — IPRATROPIUM BROMIDE AND ALBUTEROL SULFATE 2.5; .5 MG/3ML; MG/3ML
3 SOLUTION RESPIRATORY (INHALATION) EVERY 4 HOURS PRN
COMMUNITY
Start: 2023-10-13

## 2024-08-24 RX ORDER — SACUBITRIL AND VALSARTAN 49; 51 MG/1; MG/1
1 TABLET, FILM COATED ORAL 2 TIMES DAILY
COMMUNITY
Start: 2022-11-05

## 2024-08-24 RX ORDER — MOXIFLOXACIN 5 MG/ML
1 SOLUTION/ DROPS OPHTHALMIC 4 TIMES DAILY
Status: DISCONTINUED | OUTPATIENT
Start: 2024-08-24 | End: 2024-08-25 | Stop reason: HOSPADM

## 2024-08-24 RX ORDER — POTASSIUM CHLORIDE 20 MEQ/1
20 TABLET, EXTENDED RELEASE ORAL ONCE
Status: COMPLETED | OUTPATIENT
Start: 2024-08-24 | End: 2024-08-24

## 2024-08-24 ASSESSMENT — COGNITIVE AND FUNCTIONAL STATUS - GENERAL
DAILY ACTIVITIY SCORE: 18
TURNING FROM BACK TO SIDE WHILE IN FLAT BAD: A LITTLE
DRESSING REGULAR LOWER BODY CLOTHING: A LITTLE
WALKING IN HOSPITAL ROOM: A LOT
EATING MEALS: A LITTLE
PERSONAL GROOMING: A LITTLE
MOVING FROM LYING ON BACK TO SITTING ON SIDE OF FLAT BED WITH BEDRAILS: A LITTLE
EATING MEALS: A LITTLE
PERSONAL GROOMING: A LITTLE
TOILETING: A LITTLE
DRESSING REGULAR UPPER BODY CLOTHING: A LITTLE
DRESSING REGULAR LOWER BODY CLOTHING: A LITTLE
MOVING TO AND FROM BED TO CHAIR: A LITTLE
MOBILITY SCORE: 14
HELP NEEDED FOR BATHING: A LITTLE
MOVING FROM LYING ON BACK TO SITTING ON SIDE OF FLAT BED WITH BEDRAILS: A LITTLE
TOILETING: A LITTLE
HELP NEEDED FOR BATHING: A LITTLE
TURNING FROM BACK TO SIDE WHILE IN FLAT BAD: A LITTLE
STANDING UP FROM CHAIR USING ARMS: A LOT
STANDING UP FROM CHAIR USING ARMS: A LOT
MOVING TO AND FROM BED TO CHAIR: A LITTLE
WALKING IN HOSPITAL ROOM: A LOT
CLIMB 3 TO 5 STEPS WITH RAILING: TOTAL
DAILY ACTIVITIY SCORE: 18
CLIMB 3 TO 5 STEPS WITH RAILING: A LOT
MOBILITY SCORE: 15
DRESSING REGULAR UPPER BODY CLOTHING: A LITTLE

## 2024-08-24 ASSESSMENT — PAIN SCALES - GENERAL
PAINLEVEL_OUTOF10: 0 - NO PAIN

## 2024-08-24 ASSESSMENT — PAIN - FUNCTIONAL ASSESSMENT
PAIN_FUNCTIONAL_ASSESSMENT: 0-10
PAIN_FUNCTIONAL_ASSESSMENT: 0-10

## 2024-08-24 NOTE — PROGRESS NOTES
Daily Progress Note    Chief Concern  Eloy Li is a 69 y.o. female on day 1 of hospitalization for Shortness of breath, cough, LE swelling, bilateral conjunctivitis     Subjective   Subjective:  Eloy Li reports ongoing cough and grittiness/blurriness of eyes but no chest pain or eye pain this morning. She is still peeing fine, did not notice significant change this morning. Denies acute discomfort but is overall feeling about the same as when she came in     Interval events:  - Overnight team placed on nystatin for under-breast rash, mupiorocin for lower eyelid cracking.   - pt K 3.4, stopped IV replacement I/s/o burning, ordered but did not receive oral replacement  - Ophtho saw pt and recommended drops to cover for bacterial conjunctivitis, will schedule f/up with retina outpt     Objective   Vitals:  Notable for net neg 500mL, vitals WNL   Temp:  [36 °C (96.8 °F)-37.1 °C (98.8 °F)] 37.1 °C (98.8 °F)  Heart Rate:  [] 89  Resp:  [12-20] 16  BP: (104-146)/() 104/68  SpO2:  [92 %-95 %] 94 %    Intake/Output Summary (Last 24 hours) at 8/24/2024 1418  Last data filed at 8/24/2024 1016  Gross per 24 hour   Intake 450 ml   Output 950 ml   Net -500 ml       Physical exam:  Physical Exam  Constitutional:       General: She is not in acute distress.     Comments: Chronically ill appearing, laying on right side   HENT:      Head: Normocephalic.      Nose: Congestion present.      Mouth/Throat:      Mouth: Mucous membranes are moist.   Eyes:      General: Gaze aligned appropriately.         Right eye: Discharge present.         Left eye: Discharge present.     Conjunctiva/sclera:      Right eye: Right conjunctiva is injected. Exudate present.      Left eye: Left conjunctiva is injected. Exudate present.   Cardiovascular:      Rate and Rhythm: Normal rate and regular rhythm.      Pulses: Normal pulses.      Heart sounds: No murmur heard.     No friction rub. No gallop.   Pulmonary:       Effort: No respiratory distress.      Breath sounds: Decreased air movement and transmitted upper airway sounds present. Examination of the right-lower field reveals rhonchi. Rhonchi present.      Comments: Gurgling audible with breathing, audible in all fields  Abdominal:      Palpations: Abdomen is soft.      Comments: Stretch marks, folds of skin soft and nontender   Musculoskeletal:         General: Normal range of motion.      Right lower leg: Edema (trace bilateral chronic stasis changes with flaking) present.      Left lower leg: Edema present.   Skin:     General: Skin is warm and dry.      Coloration: Skin is not jaundiced.      Findings: Rash present.      Comments: Stretch marks, circular macules <1cm across upper back and shoulders with some central hypopigmentation, hyperpigmented areas under breasts without satellite lesions   Neurological:      General: No focal deficit present.      Mental Status: She is alert.   Psychiatric:         Mood and Affect: Mood normal.         Behavior: Behavior normal.         Medications:  apixaban, 5 mg, oral, q12h  aspirin, 81 mg, oral, Daily  atorvastatin, 40 mg, oral, Nightly  dapagliflozin propanediol, 10 mg, oral, Daily  digoxin, 125 mcg, oral, Daily  erythromycin, 1 cm, Both Eyes, Nightly  insulin lispro, 0-5 Units, subcutaneous, TID  lubricating eye drops, 1 drop, Both Eyes, 4x daily  metoprolol succinate XL, 25 mg, oral, Daily  moxifloxacin, 1 drop, Both Eyes, 4x daily  polyethylene glycol, 17 g, oral, Daily  sacubitriL-valsartan, 1 tablet, oral, BID  spironolactone, 25 mg, oral, Daily           PRN medications: acetaminophen, guaiFENesin, polyethylene glycol    Labs:  Results from last 7 days   Lab Units 08/24/24  0625 08/23/24  1048   WBC AUTO x10*3/uL 7.0 6.3   HEMOGLOBIN g/dL 16.9* 17.4*   HEMATOCRIT % 53.8* 51.1*   MCV fL 92 85   PLATELETS AUTO x10*3/uL 193 194     Results from last 7 days   Lab Units 08/24/24  0625 08/23/24  1832 08/23/24  1048   GLUCOSE  mg/dL 162* 193* 134*   CALCIUM mg/dL 8.5* 8.7 8.9   SODIUM mmol/L 138 138 141   POTASSIUM mmol/L 3.9 3.4* 3.7   CO2 mmol/L 30 30 32   CHLORIDE mmol/L 98 96* 96*   BUN mg/dL 13 12 12   CREATININE mg/dL 0.55 0.55 0.60     Results from last 7 days   Lab Units 08/24/24  0625 08/23/24  1832 08/23/24  1048   MAGNESIUM mg/dL  --  1.57* 1.52*   PHOSPHORUS mg/dL 3.7 4.2  --    AST U/L  --   --  22   ALT U/L  --   --  15     Wt Readings from Last 5 Encounters:   08/24/24 95.6 kg (210 lb 12.2 oz)   01/28/19 75.4 kg (166 lb 3.6 oz)     Cultures:   No results found for the last 90 days.    Imaging:  Transthoracic Echo (TTE) Complete    Result Date: 8/24/2024   [facility], [address] [place] [address]                Tel [phone] and Fax [phone] TRANSTHORACIC ECHOCARDIOGRAM REPORT  Patient Name:      JOSHUA BUSTILLO SHANNAN   Reading Physician:    [id number] Gianluca Lopez MD Study Date:        8/24/2024            Ordering Provider:    [id number] OCTAVIO GHOTRA MRN/PID:           [id number]             Fellow: Accession#:        [id number]         Nurse:                Fritz Tavarez RN Date of Birth/Age: 1955 / 69 years Sonographer:          Beverly Santiago RDCS Gender:            F                    Additional Staff: Height:            160.02 cm            Admit Date:           8/23/2024 Weight:            95.26 kg             Admission Status:     Inpatient -                                                               Routine BSA / BMI:         1.97 m2 / 37.20      Encounter#:           [id number]                    kg/m2 Blood Pressure:    120/85 mmHg          Department Location:  [facility]                                                               Invasive Study Type:    TRANSTHORACIC ECHO (TTE) COMPLETE  Diagnosis/ICD: Heart failure, unspecified-I50.9 Indication:    Heart failure exacerbation CPT Code:      Echo Complete w Full [id number] Patient History: Pertinent History: HFrEF; AFIB on Eliquis; SOB; LE edema; Cough; tobacco use. Study Detail: The following Echo studies were performed: 2D, M-Mode, Doppler and               color flow. Technically challenging study due to body habitus.               Definity used as a contrast agent for endocardial border               definition. Total contrast used for this procedure was 2 mL via IV               push.  PHYSICIAN INTERPRETATION: Left Ventricle: Left ventricular ejection fraction is moderately decreased, by visual estimate at 30-35%. There is global hypokinesis of the left ventricle with minor regional variations. The left ventricular cavity size is severely dilated. There is mild eccentric left ventricular hypertrophy. The interventricular septum is flattened in systole and diastole, consistent with right ventricular pressure and volume overload. Spectral Doppler shows a restrictive pattern of left ventricular diastolic filling. Left Atrium: The left atrium is moderate to severely dilated. Right Ventricle: The right ventricle is moderately enlarged. There is reduced right ventricular systolic function. Right Atrium: The right atrium is upper limits of normal in size. Aortic Valve: The aortic valve is trileaflet. There is mild to moderate aortic valve cusp calcification. There is no evidence of aortic valve regurgitation. The peak instantaneous gradient of the aortic valve is 5.2 mmHg. Mitral Valve: The mitral valve is mild to moderately thickened. There is mild mitral annular calcification. There is mild mitral valve regurgitation. Tricuspid Valve: The tricuspid valve is structurally normal. There is trace to mild tricuspid regurgitation. The Doppler estimated RVSP is moderately elevated at 58.1 mmHg. Pulmonic Valve: The pulmonic valve is structurally normal.  There is physiologic pulmonic valve regurgitation. Pericardium: There is a trivial to small pericardial effusion. Aorta: The aortic root is normal. There is mild dilatation of the ascending aorta. Systemic Veins: The inferior vena cava appears to be of normal size. There is less than 50% IVC collapse with inspiration. In comparison to the previous echocardiogram(s): Compared with study dated 10/27/2022, the RV systolic pressure is increased (was 39 mmHg).  CONCLUSIONS:  1. Left ventricular ejection fraction is moderately decreased, by visual estimate at 30-35%.  2. There is global hypokinesis of the left ventricle with minor regional variations.  3. Spectral Doppler shows a restrictive pattern of left ventricular diastolic filling.  4. Left ventricular cavity size is severely dilated.  5. Right ventricular volume and pressure overload.  6. There is mild eccentric left ventricular hypertrophy.  7. There is reduced right ventricular systolic function.  8. Moderately enlarged right ventricle.  9. The left atrium is moderate to severely dilated. 10. Moderately elevated right ventricular systolic pressure. QUANTITATIVE DATA SUMMARY: 2D MEASUREMENTS:                          Normal Ranges: IVSd:          0.90 cm   (0.6-1.1cm) LVPWd:         0.90 cm   (0.6-1.1cm) LVIDd:         5.65 cm   (3.9-5.9cm) LVIDs:         5.05 cm LV Mass Index: 107 g/m2 LVEDV Index:   113 ml/m2 LV % FS        10.6 % LA VOLUME:                               Normal Ranges: LA Vol A4C:        97.5 ml    (22+/-6mL/m2) LA Vol A2C:        87.3 ml LA Vol BP:         92.3 ml LA Vol Index A4C:  49.4ml/m2 LA Vol Index A2C:  44.2 ml/m2 LA Vol Index BP:   46.8 ml/m2 LA Area A4C:       27.7 cm2 LA Area A2C:       26.2 cm2 LA Major Axis A4C: 6.7 cm LA Major Axis A2C: 6.7 cm RA VOLUME BY A/L METHOD:                       Normal Ranges: RA Area A4C: 17.5 cm2 M-MODE MEASUREMENTS:                  Normal Ranges: Ao Root: 2.90 cm (2.0-3.7cm) LAs:     4.40 cm  (2.7-4.0cm) AORTA MEASUREMENTS:                      Normal Ranges: Ao Sinus, d: 2.80 cm (2.1-3.5cm) Asc Ao, d:   3.70 cm (2.1-3.4cm) LV SYSTOLIC FUNCTION BY 2D PLANIMETRY (MOD):                      Normal Ranges: EF-A4C View:    28 % (>=55%) EF-A2C View:    38 % EF-Biplane:     33 % EF-Visual:      33 % LV EF Reported: 33 % LV DIASTOLIC FUNCTION:                               Normal Ranges: MV Peak E:        1.14 m/s    (0.7-1.2 m/s) MV Peak A:        0.42 m/s    (0.42-0.7 m/s) E/A Ratio:        2.74        (1.0-2.2) MV lateral e'     0.06 m/s MV A Dur:         121.00 msec PulmV Sys Adriano:    28.90 cm/s PulmV Crane Adriano:   50.50 cm/s PulmV S/D Adriano:    0.60 PulmV A Revs Adriano: 29.40 cm/s PulmV A Revs Dur: 100.00 msec MITRAL VALVE:                 Normal Ranges: MV DT: 151 msec (150-240msec) MITRAL INSUFFICIENCY:                      Normal Ranges: MR VTI:  191.00 cm MR Vmax: 564.00 cm/s AORTIC VALVE:                         Normal Ranges: AoV Vmax:      1.14 m/s (<=1.7m/s) AoV Peak P.2 mmHg (<20mmHg) LVOT Max Adriano:  0.74 m/s (<=1.1m/s) LVOT VTI:      13.70 cm LVOT Diameter: 1.80 cm  (1.8-2.4cm) AoV Area,Vmax: 1.66 cm2 (2.5-4.5cm2)  RIGHT VENTRICLE: RV Basal 5.64 cm RV Mid   3.50 cm RV Major 7.2 cm TAPSE:   13.1 mm RV s'    0.07 m/s TRICUSPID VALVE/RVSP:                             Normal Ranges: Peak TR Velocity: 3.54 m/s RV Syst Pressure: 58.1 mmHg (< 30mmHg) IVC Diam:         1.78 cm PULMONIC VALVE:                         Normal Ranges: PV Accel Time: 71 msec  (>120ms) PV Max Adriano:    0.7 m/s  (0.6-0.9m/s) PV Max P.7 mmHg Pulmonary Veins: PulmV A Revs Dur: 100.00 msec PulmV A Revs Adriano: 29.40 cm/s PulmV Crane Adriano:   50.50 cm/s PulmV S/D Adriano:    0.60 PulmV Sys Adriano:    28.90 cm/s  [id number] Gianluca Lopez MD Electronically signed on 2024 at 1:28:53 PM  ** Final **     ECG 12 lead    Result Date: 2024  Normal sinus rhythm with sinus arrhythmia Possible Left atrial enlargement Left axis deviation Low voltage  QRS Inferior infarct , age undetermined Cannot rule out Anterior infarct (cited on or before 27-OCT-2022) Prolonged QT Abnormal ECG When compared with ECG of 31-OCT-2022 23:52, QT has shortened See ED provider note for full interpretation and clinical correlation Confirmed by Palak Delacruz ([id number]) on 8/23/2024 10:43:16 PM    XR chest 2 views    Result Date: 8/23/2024  STUDY: Chest Radiographs;  8/23/24 at 10:46 AM. INDICATION: Dyspnea. COMPARISON: None Available ACCESSION NUMBER(S): [id number] ORDERING CLINICIAN: OCTAVIO GHOTRA TECHNIQUE:  Frontal and lateral chest. FINDINGS: CARDIOMEDIASTINAL SILHOUETTE: The heart size is enlarged.  There is mild elevation of the right hemidiaphragm.  LUNGS: There is prominence of the central pulmonary vasculature.  There is no pneumothorax or pleural effusion.  ABDOMEN: No remarkable upper abdominal findings.  BONES: No acute osseous changes.    Cardiomegaly.  Mild prominence of central pulmonary vasculature.  Mild chronic changes without acute process. Signed by Mikey Cavanaugh DO        Assessment   Assessment and Plan:  Eloy Li is a 69 y.o. female w/ a PMH of HFrEF (EF 25-30% 10/2022), atrial fibrillation/flutter on eliquis (unclear adherence), HTN, T2DM, tobacco use (20 pack/year history), schizophrenia that presented from home with SOB, LE swelling, cough, conjunctivitis. In the ED, CXR w/ pulmonary vascular congestion, appears to have LE edema and bibasilar crackles, BNP elevated at 535. Last TTE is from two years ago with reduced EF which makes HF exacerbation likely. She has optimal GDMT prescribed but is reported to miss doses at home. She does not have a diagnosis of COPD, prior imaging does not show emphysema, no wheezing on exam, but has strong smoking history and productive cough with increased SOB x1 week making COPD exacerbation also a possibility. Her cough and conjunctivitis are likely viral, she is afebrile with normal WBC and no focal findings on  CXR concerning for pneumonia.    Updates 08/24/24:  - overnight received mupirocin for undereye and nystatin for skin rash  - ophtho recommendations: drops b/l eyes for coverage of bacterial conjunctivitis. Discontinued overnight mupirocin  - did not tolerate IV K, oral ordered but pharmacy issue  - persistently elevated H&H 16.9/53.8, likely 2/2 acute exacerbation of HF  - restart torsemide 20mg M/W/F (per [facility] documentation)  - likely stable for home tomorrow     #HFrEF (EF 25-30% 10/2022)  #HTN  #CAD  :: Home meds include              - metoprolol succinate 50 mg              - entresto 49/51 mg               - Farxiga 10 mg              - spironolactone 25 mg              - torsemide 20 mg  :: s/p 40 IV lasix x1 in ED  :: C 12/2018 nonobstructive CAD  :: Mild elevation of trop and H&H from baselines most likely 2/2 exacerbation   PLAN:  - Repeat TTE today with EF 33%  - assess response to lasix from ED with IVC on echo and re-dose if needed for goal net neg 2-2.5 L/day. Dry weight relatively unclear,   - restart home metoprolol to 50 mg succinate   - Continue home GDMT including farxiga, entresto, spironolactone  - BID RFP while diuresing   - Continue home aspirin and atorvastatin   - Restart home torsemide 20mg every MWF     #URI  #Conjunctivitis  :: most likely viral as it is bilateral  :: productive cough x1 week, no fevers/chills/nausea/vomiting/diarrhea   :: COVID negative  ::CXR not concerning for obvious PNA  ::optho believes viral but wants to cover for bacterial  ::procal negative  PLAN:  - drops per optho: erythromycin, moxifloxacin, artificial tears  - Checking viral panel, flu, RSV, sputum culture  - mucinex      #Atrial fibrillation/flutter  ::Hx cardioversion 10/2023  PLAN:  - continue home digoxin 125 mcg  - Unclear adherence to eliquis 5mg BID, started heparin, restart Eliquis as possible    #Chest rash  - asymptomatic per patient  PLAN:  - outpatient follow up    #T2DM  - SSI while inpatient        Medical Check List   FEN  -Fluids: Cautious until TTE  -Electrolytes: Will replete PRN, with goals of Mg >2, K>4  -Nutrition: Low sodium diet  Prophylaxis:  -DVT ppx: Heparin gtt for afib  -GI ppx/Bowel care: miralax PRN  -Abx: eye drops per ophtho, above  -Pain regimen: Tylenol PRN  Hardware:  -Lines: PIV  Social:  -Code: Full Code  -NOK/Surrogate Decision Maker: Son Ambrose  and daughter elodia      Patient discussed with Attending Dr. Mariee    Author: Lourdes Stewart MD (Anna)  PGY1, Internal Medicine    (Available by Epic Chat, no personal pager)

## 2024-08-24 NOTE — PROGRESS NOTES
Pharmacy Medication History Review    Eloy Li is a 69 y.o. female admitted for Acute on chronic congestive heart failure, unspecified heart failure type (Multi). Pharmacy reviewed the patient's cvebf-cm-xitaozxcg medications and allergies for accuracy.    Medications ADDED:  All    The list below reflects the updated PTA list.   Prior to Admission Medications   Prescriptions Last Dose Informant   apixaban (Eliquis) 5 mg tablet  Child   Sig: Take 1 tablet (5 mg) by mouth 2 times a day.   atorvastatin (Lipitor) 80 mg tablet  Child   Sig: Take 0.5 tablets (40 mg) by mouth once daily.   dapagliflozin propanediol (Farxiga) 10 mg  Child   Sig: Take 1 tablet (10 mg) by mouth once daily.   digoxin (Lanoxin) 125 MCG tablet  Child   Sig: Take 1 tablet (125 mcg) by mouth once daily.   dulaglutide (Trulicity) 1.5 mg/0.5 mL pen injector injection  Child   Sig: Inject 1.5 mg under the skin every 7 days. Thursday   ipratropium-albuteroL (Duo-Neb) 0.5-2.5 mg/3 mL nebulizer solution  Child   Sig: Take 3 mL by nebulization every 4 hours if needed (Cough).   metoprolol succinate XL (Toprol-XL) 50 mg 24 hr tablet  Child   Sig: Take 1 tablet (50 mg) by mouth once daily.   sacubitriL-valsartan (Entresto) 49-51 mg tablet  Child   Sig: Take 1 tablet by mouth twice a day.   spironolactone (Aldactone) 25 mg tablet  Child   Sig: Take 1 tablet (25 mg) by mouth once daily.   torsemide (Demadex) 20 mg tablet  Child   Sig: Take 1 tablet (20 mg) by mouth 2 times a day. If <200lbs, switch to once daily      Facility-Administered Medications: None      The list below reflects the updated allergy list. Please review each documented allergy for additional clarification and justification.  Allergies  Reviewed by Evelyn Hill on 8/24/2024        Severity Reactions Comments    Penicillin High Swelling           Patient declines M2B at discharge.     Sources:   Pharmacy fill history  Patients daughterRosemary  [facility]  "history    Additional Comments:  Patient is unable to say what she takes, and relies on daughter for all healthcare needs  Patient is extremely noncompliant, and rarely takes her meds  Daughter states that she last took most of her meds day before hospitalization    ADELA CHAMBERS  PGY-1 Pharmacy Resident  08/24/24     Secure Chat preferred   If no response call [phone] or [id number] \"Med Rec\"    "

## 2024-08-24 NOTE — CARE PLAN
The patient's goals for the shift include      The clinical goals for the shift include pt to have therapeutic heparin assay levels during this shift    Over the shift, the patient did not make progress toward the following goals. Barriers to progression include n/a. Recommendations to address these barriers include n/a.

## 2024-08-25 VITALS
OXYGEN SATURATION: 94 % | TEMPERATURE: 97.5 F | SYSTOLIC BLOOD PRESSURE: 132 MMHG | BODY MASS INDEX: 35.23 KG/M2 | HEART RATE: 66 BPM | DIASTOLIC BLOOD PRESSURE: 85 MMHG | HEIGHT: 63 IN | WEIGHT: 198.85 LBS | RESPIRATION RATE: 17 BRPM

## 2024-08-25 LAB
ALBUMIN SERPL BCP-MCNC: 2.8 G/DL (ref 3.4–5)
ANION GAP SERPL CALC-SCNC: 15 MMOL/L (ref 10–20)
BUN SERPL-MCNC: 15 MG/DL (ref 6–23)
CALCIUM SERPL-MCNC: 8.5 MG/DL (ref 8.6–10.6)
CHLORIDE SERPL-SCNC: 95 MMOL/L (ref 98–107)
CO2 SERPL-SCNC: 31 MMOL/L (ref 21–32)
CREAT SERPL-MCNC: 0.66 MG/DL (ref 0.5–1.05)
EGFRCR SERPLBLD CKD-EPI 2021: >90 ML/MIN/1.73M*2
ERYTHROCYTE [DISTWIDTH] IN BLOOD BY AUTOMATED COUNT: 16.4 % (ref 11.5–14.5)
GLUCOSE BLD MANUAL STRIP-MCNC: 147 MG/DL (ref 74–99)
GLUCOSE BLD MANUAL STRIP-MCNC: 183 MG/DL (ref 74–99)
GLUCOSE SERPL-MCNC: 134 MG/DL (ref 74–99)
HCT VFR BLD AUTO: 55.6 % (ref 36–46)
HGB BLD-MCNC: 17.3 G/DL (ref 12–16)
MAGNESIUM SERPL-MCNC: 1.77 MG/DL (ref 1.6–2.4)
MCH RBC QN AUTO: 28.9 PG (ref 26–34)
MCHC RBC AUTO-ENTMCNC: 31.1 G/DL (ref 32–36)
MCV RBC AUTO: 93 FL (ref 80–100)
NRBC BLD-RTO: 0 /100 WBCS (ref 0–0)
PHOSPHATE SERPL-MCNC: 3.2 MG/DL (ref 2.5–4.9)
PLATELET # BLD AUTO: 201 X10*3/UL (ref 150–450)
POTASSIUM SERPL-SCNC: 4 MMOL/L (ref 3.5–5.3)
RBC # BLD AUTO: 5.99 X10*6/UL (ref 4–5.2)
SODIUM SERPL-SCNC: 137 MMOL/L (ref 136–145)
WBC # BLD AUTO: 6.5 X10*3/UL (ref 4.4–11.3)

## 2024-08-25 PROCEDURE — 80069 RENAL FUNCTION PANEL: CPT

## 2024-08-25 PROCEDURE — 2500000002 HC RX 250 W HCPCS SELF ADMINISTERED DRUGS (ALT 637 FOR MEDICARE OP, ALT 636 FOR OP/ED)

## 2024-08-25 PROCEDURE — 82947 ASSAY GLUCOSE BLOOD QUANT: CPT

## 2024-08-25 PROCEDURE — 99239 HOSP IP/OBS DSCHRG MGMT >30: CPT

## 2024-08-25 PROCEDURE — 2500000001 HC RX 250 WO HCPCS SELF ADMINISTERED DRUGS (ALT 637 FOR MEDICARE OP)

## 2024-08-25 PROCEDURE — 2500000004 HC RX 250 GENERAL PHARMACY W/ HCPCS (ALT 636 FOR OP/ED)

## 2024-08-25 PROCEDURE — 85027 COMPLETE CBC AUTOMATED: CPT

## 2024-08-25 PROCEDURE — 2500000005 HC RX 250 GENERAL PHARMACY W/O HCPCS

## 2024-08-25 PROCEDURE — 83735 ASSAY OF MAGNESIUM: CPT

## 2024-08-25 PROCEDURE — 36415 COLL VENOUS BLD VENIPUNCTURE: CPT

## 2024-08-25 RX ORDER — MAGNESIUM SULFATE HEPTAHYDRATE 40 MG/ML
2 INJECTION, SOLUTION INTRAVENOUS ONCE
Status: COMPLETED | OUTPATIENT
Start: 2024-08-25 | End: 2024-08-25

## 2024-08-25 RX ORDER — MOXIFLOXACIN 5 MG/ML
1 SOLUTION/ DROPS OPHTHALMIC 4 TIMES DAILY
Qty: 3 ML | Refills: 1 | Status: SHIPPED | OUTPATIENT
Start: 2024-08-25 | End: 2024-08-31

## 2024-08-25 RX ORDER — ASPIRIN 81 MG/1
81 TABLET ORAL DAILY
Qty: 30 TABLET | Refills: 2 | Status: SHIPPED | OUTPATIENT
Start: 2024-08-26

## 2024-08-25 RX ORDER — ERYTHROMYCIN 5 MG/G
OINTMENT OPHTHALMIC NIGHTLY
Qty: 3.5 G | Refills: 1 | Status: SHIPPED | OUTPATIENT
Start: 2024-08-25

## 2024-08-25 RX ORDER — GUAIFENESIN 600 MG/1
600 TABLET, EXTENDED RELEASE ORAL 2 TIMES DAILY PRN
Qty: 30 TABLET | Refills: 0 | Status: SHIPPED | OUTPATIENT
Start: 2024-08-25

## 2024-08-25 ASSESSMENT — COGNITIVE AND FUNCTIONAL STATUS - GENERAL
DRESSING REGULAR UPPER BODY CLOTHING: A LITTLE
PERSONAL GROOMING: A LITTLE
TOILETING: A LITTLE
HELP NEEDED FOR BATHING: A LITTLE
DAILY ACTIVITIY SCORE: 18
STANDING UP FROM CHAIR USING ARMS: A LOT
MOBILITY SCORE: 15
TURNING FROM BACK TO SIDE WHILE IN FLAT BAD: A LITTLE
CLIMB 3 TO 5 STEPS WITH RAILING: A LOT
WALKING IN HOSPITAL ROOM: A LOT
MOVING TO AND FROM BED TO CHAIR: A LITTLE
MOVING FROM LYING ON BACK TO SITTING ON SIDE OF FLAT BED WITH BEDRAILS: A LITTLE
EATING MEALS: A LITTLE
DRESSING REGULAR LOWER BODY CLOTHING: A LITTLE

## 2024-08-25 ASSESSMENT — PAIN SCALES - GENERAL: PAINLEVEL_OUTOF10: 0 - NO PAIN

## 2024-08-25 ASSESSMENT — PAIN - FUNCTIONAL ASSESSMENT: PAIN_FUNCTIONAL_ASSESSMENT: 0-10

## 2024-08-25 NOTE — HOSPITAL COURSE
"Eloy Li is a 69 y.o. female w/a PMH of  HFrEF (EF 25-30% 10/2022), atrial fibrillation/flutter on eliquis (unclear adherence), HTN, T2DM, tobacco use (20 pack/year history), schizophrenia that presented from home with SOB, LE swelling, cough, conjunctivitis. She was admitted for HF exacerbation.      Patient stated she had increased SOB and edema for about one week and a productive cough that started one week prior and produced green sputum. She denied fevers, chills, nausea, vomiting, active CP. Per her son, she had been dealing with increased ANDREW and LE edema for months. Her son took her to the ED due to increased SOB and conjunctivitis. Her daughter was able to help us confirm her home medications and stated she takes her meds \"most days\" but is not always adherent.      In the emergency room, HR 98 NSR, /104, afebrile and on room air. COVID negative, CXR with prominence of pulmonary vasculature, no WBC elevation, troponins 195 -> 173, like demand ischemia as she did not have CP, EKG without c/f ischemia,  similar to past admissions. Low Mg 1.52, repleted. She was given 40 IV lasix once with good response. Her presentation was most consistent with HF exacerbation in the setting of not taking her diuertic as prescribed at home. Her TTE demonstrated an EF 33% which was similar to prior echos. She responded well to PO torsemide and was euvolemic by 8/25. He other home meds for HF and atrial fibrillation were continued. She reported improvement in her cough and conjunctivitis by 8/25. Her respiratory viral panel was negative and pro calcitonin was normal, she remained afebrile with normal WBC so we were not concerned with bacterial infection and did not treat with systemic antibiotics. Ophthalmology was consulted and recommended Moxifloxacin eye drops and lubricating eye drops which she was prescribed on discharge. She was given instructions on how to take her medications and family was also " educated on her meds and diet. She was stable with normal vital signs on discharge.

## 2024-08-25 NOTE — DISCHARGE INSTRUCTIONS
Dear Eloy Li,    You were admitted to [facility] from 8/23 to 8/25 for Acute heart failure exacerbation and viral respiratory infection.     When you presented to our hospital you had an increased amount of fluid on your lungs and legs which is from your heart failure. It is crucial to take all of your medications every day as the bottle states. Your torsemide is especially important to help get fluid off of your lungs and legs which will let you breathe better. Your eliquis is very important for preventing strokes. Your farxiga, entresto, metoprolol and spironolactone are very important for your heart- these make your heart stronger.     It is also important to limit your fluid intake. You should drink LESS than 2 liters of fluid per day. You should also consume a LOW salt diet which is less than 2 grams of salt per day.    You presented with a new cough that is likely due to a viral illness. Viruses do not respond to antibiotics, the treatment is supportive care with over the counter cold medicine. We have prescribed mucinex which you will receive when you  your eye drops.    You also presented with redness in both eyes, known as conjunctivitis. This is most likely from a virus as well but can also be caused by bacteria in rare cases. To be safe, we have prescribed you an antibiotic eye drop and some lubricating eye drops for comfort. You will need to pick these up at the [facility] in [place] on [address]. You will also need to follow up with ophthalmology in 4 weeks.      Appointments/follow-up:  Please follow up with your cardiologist at [facility] for continued care.   Please see your primary care provider within 2 weeks.  PLEASE CALL  to schedule appointments with the specialities listed below:  Ophthalmology in 4 weeks    It was a pleasure taking care of you,    Your  Care Team

## 2024-08-25 NOTE — DISCHARGE SUMMARY
Discharge Diagnosis  Acute on chronic congestive heart failure, unspecified heart failure type (Multi)    Issues Requiring Follow-Up  -Heart failure with reduced ejection fraction  -Viral URI  -Bilateral Conjunctivitis     Discharge Meds     Your medication list        START taking these medications        Instructions Last Dose Given Next Dose Due   aspirin 81 mg EC tablet  Start taking on: August 26, 2024      Take 1 tablet (81 mg) by mouth once daily.       erythromycin 5 mg/gram (0.5 %) ophthalmic ointment  Commonly known as: Romycin      Apply to both eyes once daily at bedtime. Apply Amount per Dose: 0.5 inch (~1 cm) per dose.       guaiFENesin 600 mg 12 hr tablet  Commonly known as: Mucinex      Take 1 tablet (600 mg) by mouth 2 times a day as needed for cough. Do not crush, chew, or split.       lubricating eye drops ophthalmic solution      Administer 1 drop into both eyes 4 times a day.       moxifloxacin 0.5 % ophthalmic solution  Commonly known as: Vigamox      Administer 1 drop into both eyes 4 times a day for 22 doses.              CONTINUE taking these medications        Instructions Last Dose Given Next Dose Due   apixaban 5 mg tablet  Commonly known as: Eliquis           atorvastatin 80 mg tablet  Commonly known as: Lipitor           dapagliflozin propanediol 10 mg  Commonly known as: Farxiga           digoxin 125 MCG tablet  Commonly known as: Lanoxin           dulaglutide 1.5 mg/0.5 mL pen injector injection  Commonly known as: Trulicity           Entresto 49-51 mg tablet  Generic drug: sacubitriL-valsartan           ipratropium-albuteroL 0.5-2.5 mg/3 mL nebulizer solution  Commonly known as: Duo-Neb           metoprolol succinate XL 50 mg 24 hr tablet  Commonly known as: Toprol-XL           spironolactone 25 mg tablet  Commonly known as: Aldactone           torsemide 20 mg tablet  Commonly known as: Demadex                     Where to Get Your Medications        These medications were sent to  "[id number] - [place], [place] - [address] [address] [facility] [address] AT [facility] [address] [facility] [facility] [place] [address]      Phone: [phone]   aspirin 81 mg EC tablet  erythromycin 5 mg/gram (0.5 %) ophthalmic ointment  guaiFENesin 600 mg 12 hr tablet  lubricating eye drops ophthalmic solution  moxifloxacin 0.5 % ophthalmic solution         Test Results Pending At Discharge  Pending Labs       No current pending labs.            Hospital Course  Eloy Li is a 69 y.o. female w/a PMH of  HFrEF (EF 25-30% 10/2022), atrial fibrillation/flutter on eliquis (unclear adherence), HTN, T2DM, tobacco use (20 pack/year history), schizophrenia that presented from home with SOB, LE swelling, cough, conjunctivitis. She was admitted for HF exacerbation.      Patient stated she had increased SOB and edema for about one week and a productive cough that started one week prior and produced green sputum. She denied fevers, chills, nausea, vomiting, active CP. Per her son, she had been dealing with increased ANDREW and LE edema for months. Her son took her to the ED due to increased SOB and conjunctivitis. Her daughter was able to help us confirm her home medications and stated she takes her meds \"most days\" but is not always adherent.      In the emergency room, HR 98 NSR, /104, afebrile and on room air. COVID negative, CXR with prominence of pulmonary vasculature, no WBC elevation, troponins 195 -> 173, like demand ischemia as she did not have CP, EKG without c/f ischemia,  similar to past admissions. Low Mg 1.52, repleted. She was given 40 IV lasix once with good response. Her presentation was most consistent with HF exacerbation in the setting of not taking her diuertic as prescribed at home. Her TTE demonstrated an EF 33% which was similar to prior echos. She responded well to PO torsemide and was euvolemic by 8/25. He other home meds for HF and atrial fibrillation were continued. She reported " improvement in her cough and conjunctivitis by 8/25. Her respiratory viral panel was negative and pro calcitonin was normal, she remained afebrile with normal WBC so we were not concerned with bacterial infection and did not treat with systemic antibiotics. Ophthalmology was consulted and recommended Moxifloxacin eye drops and lubricating eye drops which she was prescribed on discharge. She was given instructions on how to take her medications and family was also educated on her meds and diet. She was stable with normal vital signs on discharge.    Dry weight: roughly 90-95 kg    Pertinent Physical Exam At Time of Discharge  Physical Exam  General: Awake, alert, conversant, appears stated age  HEENT: Bilateral conjunctivitis, pink sclera, bilateral drainage and crust, improving compared to prior exams  Skin: No suspect lesions or rashes noted on visible skin. Dry skin on BL lower extremities   Chest: CTAB, no wheezing, normal respiratory effort, not on supplemental oxygen  Cardiac: Regular rate and rhythm, normal s1, s2, no M/R/G  Abdomen: Soft, ND, NT, no involuntary guarding  EXT: trace BL peripheral edema, no asymmetry noted  MSK: No focal joint swelling noted  Neuro: AOx4, moving all limbs spontaneously, follows commands  Psych: Coherent thought process, appropriate mood and affect    Outpatient Follow-Up  - referral placed for ophthalmology follow up  - patient and family will call her primary cardiologist at [facility] for follow up      Sabina Gamez MD

## 2024-08-25 NOTE — PROGRESS NOTES
Pharmacy Admission Order Reconciliation Review    Eloy Li is a 69 y.o. female admitted for Acute on chronic congestive heart failure, unspecified heart failure type (Multi). Pharmacy reviewed the patient's unreconciled admission medications.    Prior to admission medications that were reviewed and acted on by the pharmacist include:  Apixaban  Atorvastatin   Farxiga  Digoxin  Trulicity  Toprol XL  Entresto  Aldactone  Demadex  These medications have been reconciled.     Any other unreconcilied medications have been addressed and will be ordered or held by the patient's medical team. Medications addressed by the pharmacist may be added or changed by the patient's medical team at any time.    Bing Mane, PharmD  Transitions of Care Pharmacist  [facility]  Please reach out via Secure Chat for questions

## 2024-08-25 NOTE — NURSING NOTE
Nursing Discharge Planning Note 8/25/24 5873    Patient provided with discharge instructions. Instructions reviewed with patient & son at bedside, including medication changes & side effects. VS stable. IVs removed with tips intact & no complications. Patient educated to follow up with cardiology, PCP, and opthalmology per discharging provider's orders. Medications e-submitted to patient's pharmacy. Patient & son verbalized understanding of discharge instructions. Removed from telemetry & taken via wheelchair with all belongings to [facility] where patient's son provided transportation home. Noni Ramirez RN

## 2024-09-17 LAB
ATRIAL RATE: 96 BPM
P AXIS: 85 DEGREES
P OFFSET: 199 MS
P ONSET: 137 MS
PR INTERVAL: 162 MS
Q ONSET: 218 MS
QRS COUNT: 16 BEATS
QRS DURATION: 108 MS
QT INTERVAL: 380 MS
QTC CALCULATION(BAZETT): 480 MS
QTC FREDERICIA: 444 MS
R AXIS: -37 DEGREES
T AXIS: 109 DEGREES
T OFFSET: 408 MS
VENTRICULAR RATE: 96 BPM

## 2025-06-24 ENCOUNTER — APPOINTMENT (OUTPATIENT)
Dept: RADIOLOGY | Facility: HOSPITAL | Age: 70
End: 2025-06-24
Payer: MEDICAID

## 2025-06-24 ENCOUNTER — CLINICAL SUPPORT (OUTPATIENT)
Dept: EMERGENCY MEDICINE | Facility: HOSPITAL | Age: 70
End: 2025-06-24
Payer: MEDICAID

## 2025-06-24 ENCOUNTER — HOSPITAL ENCOUNTER (INPATIENT)
Facility: HOSPITAL | Age: 70
LOS: 2 days | Discharge: HOME | End: 2025-06-26
Attending: EMERGENCY MEDICINE | Admitting: NURSE PRACTITIONER
Payer: MEDICAID

## 2025-06-24 DIAGNOSIS — L03.115 CELLULITIS OF RIGHT LOWER EXTREMITY: ICD-10-CM

## 2025-06-24 DIAGNOSIS — I50.9 ACUTE ON CHRONIC CONGESTIVE HEART FAILURE, UNSPECIFIED HEART FAILURE TYPE: ICD-10-CM

## 2025-06-24 DIAGNOSIS — I87.2 VENOUS STASIS DERMATITIS OF RIGHT LOWER EXTREMITY: Primary | ICD-10-CM

## 2025-06-24 DIAGNOSIS — E11.40 TYPE 2 DIABETES MELLITUS WITH DIABETIC NEUROPATHY, WITHOUT LONG-TERM CURRENT USE OF INSULIN: ICD-10-CM

## 2025-06-24 DIAGNOSIS — I73.9 PAD (PERIPHERAL ARTERY DISEASE): ICD-10-CM

## 2025-06-24 PROBLEM — I48.0 PAROXYSMAL ATRIAL FIBRILLATION (MULTI): Status: ACTIVE | Noted: 2022-11-05

## 2025-06-24 PROBLEM — I34.0 NONRHEUMATIC MITRAL VALVE REGURGITATION: Status: ACTIVE | Noted: 2023-10-19

## 2025-06-24 PROBLEM — R41.841 COGNITIVE COMMUNICATION DISORDER: Status: ACTIVE | Noted: 2022-11-07

## 2025-06-24 PROBLEM — I27.20 PULMONARY HYPERTENSION (MULTI): Status: ACTIVE | Noted: 2020-12-20

## 2025-06-24 PROBLEM — E55.9 VITAMIN D DEFICIENCY: Status: ACTIVE | Noted: 2022-11-05

## 2025-06-24 PROBLEM — E78.5 HYPERLIPIDEMIA: Status: ACTIVE | Noted: 2022-11-05

## 2025-06-24 PROBLEM — E11.9 TYPE 2 DIABETES MELLITUS WITHOUT COMPLICATION: Status: ACTIVE | Noted: 2022-11-05

## 2025-06-24 PROBLEM — K85.10 GALL STONE PANCREATITIS (HHS-HCC): Status: ACTIVE | Noted: 2020-12-20

## 2025-06-24 PROBLEM — I21.4 NSTEMI (NON-ST ELEVATED MYOCARDIAL INFARCTION) (MULTI): Status: ACTIVE | Noted: 2021-06-30

## 2025-06-24 PROBLEM — I50.20 SYSTOLIC HEART FAILURE: Status: ACTIVE | Noted: 2020-11-30

## 2025-06-24 LAB
ALBUMIN SERPL BCP-MCNC: 2.6 G/DL (ref 3.4–5)
ALP SERPL-CCNC: 148 U/L (ref 33–136)
ALT SERPL W P-5'-P-CCNC: 11 U/L (ref 7–45)
AMPHETAMINES UR QL SCN: NORMAL
ANION GAP SERPL CALC-SCNC: 12 MMOL/L (ref 10–20)
APPEARANCE UR: CLEAR
APTT PPP: 27 SECONDS (ref 26–36)
AST SERPL W P-5'-P-CCNC: 19 U/L (ref 9–39)
ATRIAL RATE: 76 BPM
BARBITURATES UR QL SCN: NORMAL
BASOPHILS # BLD AUTO: 0.05 X10*3/UL (ref 0–0.1)
BASOPHILS NFR BLD AUTO: 0.4 %
BENZODIAZ UR QL SCN: NORMAL
BILIRUB SERPL-MCNC: 1.5 MG/DL (ref 0–1.2)
BILIRUB UR STRIP.AUTO-MCNC: NEGATIVE MG/DL
BNP SERPL-MCNC: 499 PG/ML (ref 0–99)
BUN SERPL-MCNC: 14 MG/DL (ref 6–23)
BZE UR QL SCN: NORMAL
CALCIUM SERPL-MCNC: 8.6 MG/DL (ref 8.6–10.6)
CANNABINOIDS UR QL SCN: NORMAL
CHLORIDE SERPL-SCNC: 102 MMOL/L (ref 98–107)
CO2 SERPL-SCNC: 24 MMOL/L (ref 21–32)
COLOR UR: YELLOW
CREAT SERPL-MCNC: 0.5 MG/DL (ref 0.5–1.05)
CRP SERPL-MCNC: 8.38 MG/DL
EGFRCR SERPLBLD CKD-EPI 2021: >90 ML/MIN/1.73M*2
EOSINOPHIL # BLD AUTO: 0.01 X10*3/UL (ref 0–0.7)
EOSINOPHIL NFR BLD AUTO: 0.1 %
ERYTHROCYTE [DISTWIDTH] IN BLOOD BY AUTOMATED COUNT: 16.1 % (ref 11.5–14.5)
ERYTHROCYTE [SEDIMENTATION RATE] IN BLOOD BY WESTERGREN METHOD: 22 MM/H (ref 0–30)
FENTANYL+NORFENTANYL UR QL SCN: NORMAL
FLUAV RNA RESP QL NAA+PROBE: NOT DETECTED
FLUBV RNA RESP QL NAA+PROBE: NOT DETECTED
GLUCOSE SERPL-MCNC: 122 MG/DL (ref 74–99)
GLUCOSE UR STRIP.AUTO-MCNC: ABNORMAL MG/DL
HCT VFR BLD AUTO: 51.3 % (ref 36–46)
HGB BLD-MCNC: 16.6 G/DL (ref 12–16)
IMM GRANULOCYTES # BLD AUTO: 0.12 X10*3/UL (ref 0–0.7)
IMM GRANULOCYTES NFR BLD AUTO: 1.1 % (ref 0–0.9)
INR PPP: 1.5 (ref 0.9–1.1)
KETONES UR STRIP.AUTO-MCNC: ABNORMAL MG/DL
LEUKOCYTE ESTERASE UR QL STRIP.AUTO: NEGATIVE
LYMPHOCYTES # BLD AUTO: 1.59 X10*3/UL (ref 1.2–4.8)
LYMPHOCYTES NFR BLD AUTO: 14.1 %
MCH RBC QN AUTO: 28.3 PG (ref 26–34)
MCHC RBC AUTO-ENTMCNC: 32.4 G/DL (ref 32–36)
MCV RBC AUTO: 87 FL (ref 80–100)
METHADONE UR QL SCN: NORMAL
MONOCYTES # BLD AUTO: 1.22 X10*3/UL (ref 0.1–1)
MONOCYTES NFR BLD AUTO: 10.8 %
MUCOUS THREADS #/AREA URNS AUTO: ABNORMAL /LPF
NEUTROPHILS # BLD AUTO: 8.28 X10*3/UL (ref 1.2–7.7)
NEUTROPHILS NFR BLD AUTO: 73.5 %
NITRITE UR QL STRIP.AUTO: NEGATIVE
NRBC BLD-RTO: 0 /100 WBCS (ref 0–0)
OPIATES UR QL SCN: NORMAL
OXYCODONE+OXYMORPHONE UR QL SCN: NORMAL
P AXIS: 55 DEGREES
P OFFSET: 162 MS
P ONSET: 140 MS
PCP UR QL SCN: NORMAL
PH UR STRIP.AUTO: 6 [PH]
PLATELET # BLD AUTO: 241 X10*3/UL (ref 150–450)
POTASSIUM SERPL-SCNC: 4.4 MMOL/L (ref 3.5–5.3)
PR INTERVAL: 150 MS
PROT SERPL-MCNC: 6.9 G/DL (ref 6.4–8.2)
PROT UR STRIP.AUTO-MCNC: ABNORMAL MG/DL
PROTHROMBIN TIME: 16.7 SECONDS (ref 9.8–12.4)
Q ONSET: 215 MS
QRS COUNT: 12 BEATS
QRS DURATION: 88 MS
QT INTERVAL: 456 MS
QTC CALCULATION(BAZETT): 513 MS
QTC FREDERICIA: 493 MS
R AXIS: -14 DEGREES
RBC # BLD AUTO: 5.87 X10*6/UL (ref 4–5.2)
RBC # UR STRIP.AUTO: ABNORMAL MG/DL
RBC #/AREA URNS AUTO: ABNORMAL /HPF
SARS-COV-2 RNA RESP QL NAA+PROBE: NOT DETECTED
SODIUM SERPL-SCNC: 134 MMOL/L (ref 136–145)
SP GR UR STRIP.AUTO: >1.05
SQUAMOUS #/AREA URNS AUTO: ABNORMAL /HPF
T AXIS: 88 DEGREES
T OFFSET: 443 MS
UROBILINOGEN UR STRIP.AUTO-MCNC: ABNORMAL MG/DL
VENTRICULAR RATE: 76 BPM
WBC # BLD AUTO: 11.3 X10*3/UL (ref 4.4–11.3)
WBC #/AREA URNS AUTO: ABNORMAL /HPF

## 2025-06-24 PROCEDURE — 99285 EMERGENCY DEPT VISIT HI MDM: CPT | Mod: 25 | Performed by: EMERGENCY MEDICINE

## 2025-06-24 PROCEDURE — 93010 ELECTROCARDIOGRAM REPORT: CPT | Performed by: NURSE PRACTITIONER

## 2025-06-24 PROCEDURE — 1210000001 HC SEMI-PRIVATE ROOM DAILY

## 2025-06-24 PROCEDURE — 93005 ELECTROCARDIOGRAM TRACING: CPT

## 2025-06-24 PROCEDURE — 2550000001 HC RX 255 CONTRASTS: Performed by: EMERGENCY MEDICINE

## 2025-06-24 PROCEDURE — 93971 EXTREMITY STUDY: CPT

## 2025-06-24 PROCEDURE — 2500000004 HC RX 250 GENERAL PHARMACY W/ HCPCS (ALT 636 FOR OP/ED): Mod: SE | Performed by: NURSE PRACTITIONER

## 2025-06-24 PROCEDURE — 83036 HEMOGLOBIN GLYCOSYLATED A1C: CPT | Performed by: NURSE PRACTITIONER

## 2025-06-24 PROCEDURE — 81001 URINALYSIS AUTO W/SCOPE: CPT | Performed by: NURSE PRACTITIONER

## 2025-06-24 PROCEDURE — 71046 X-RAY EXAM CHEST 2 VIEWS: CPT

## 2025-06-24 PROCEDURE — 73610 X-RAY EXAM OF ANKLE: CPT | Mod: RIGHT SIDE | Performed by: RADIOLOGY

## 2025-06-24 PROCEDURE — 87086 URINE CULTURE/COLONY COUNT: CPT | Performed by: NURSE PRACTITIONER

## 2025-06-24 PROCEDURE — 73610 X-RAY EXAM OF ANKLE: CPT | Mod: RT

## 2025-06-24 PROCEDURE — 80307 DRUG TEST PRSMV CHEM ANLYZR: CPT | Performed by: NURSE PRACTITIONER

## 2025-06-24 PROCEDURE — 75635 CT ANGIO ABDOMINAL ARTERIES: CPT

## 2025-06-24 PROCEDURE — 73630 X-RAY EXAM OF FOOT: CPT | Mod: RIGHT SIDE | Performed by: RADIOLOGY

## 2025-06-24 PROCEDURE — 96374 THER/PROPH/DIAG INJ IV PUSH: CPT

## 2025-06-24 PROCEDURE — 85652 RBC SED RATE AUTOMATED: CPT | Performed by: EMERGENCY MEDICINE

## 2025-06-24 PROCEDURE — 86140 C-REACTIVE PROTEIN: CPT | Performed by: EMERGENCY MEDICINE

## 2025-06-24 PROCEDURE — 83880 ASSAY OF NATRIURETIC PEPTIDE: CPT | Performed by: EMERGENCY MEDICINE

## 2025-06-24 PROCEDURE — 87636 SARSCOV2 & INF A&B AMP PRB: CPT | Performed by: EMERGENCY MEDICINE

## 2025-06-24 PROCEDURE — 73630 X-RAY EXAM OF FOOT: CPT | Mod: RT

## 2025-06-24 PROCEDURE — 36415 COLL VENOUS BLD VENIPUNCTURE: CPT | Performed by: NURSE PRACTITIONER

## 2025-06-24 PROCEDURE — 85520 HEPARIN ASSAY: CPT | Performed by: NURSE PRACTITIONER

## 2025-06-24 PROCEDURE — 85025 COMPLETE CBC W/AUTO DIFF WBC: CPT | Performed by: EMERGENCY MEDICINE

## 2025-06-24 PROCEDURE — 93971 EXTREMITY STUDY: CPT | Performed by: STUDENT IN AN ORGANIZED HEALTH CARE EDUCATION/TRAINING PROGRAM

## 2025-06-24 PROCEDURE — 99223 1ST HOSP IP/OBS HIGH 75: CPT | Performed by: NURSE PRACTITIONER

## 2025-06-24 PROCEDURE — 36415 COLL VENOUS BLD VENIPUNCTURE: CPT | Performed by: EMERGENCY MEDICINE

## 2025-06-24 PROCEDURE — 75635 CT ANGIO ABDOMINAL ARTERIES: CPT | Performed by: STUDENT IN AN ORGANIZED HEALTH CARE EDUCATION/TRAINING PROGRAM

## 2025-06-24 PROCEDURE — 71046 X-RAY EXAM CHEST 2 VIEWS: CPT | Mod: FOREIGN READ | Performed by: RADIOLOGY

## 2025-06-24 PROCEDURE — 2500000001 HC RX 250 WO HCPCS SELF ADMINISTERED DRUGS (ALT 637 FOR MEDICARE OP): Performed by: NURSE PRACTITIONER

## 2025-06-24 PROCEDURE — 80053 COMPREHEN METABOLIC PANEL: CPT | Performed by: EMERGENCY MEDICINE

## 2025-06-24 PROCEDURE — 85730 THROMBOPLASTIN TIME PARTIAL: CPT | Performed by: EMERGENCY MEDICINE

## 2025-06-24 PROCEDURE — 99285 EMERGENCY DEPT VISIT HI MDM: CPT | Performed by: NURSE PRACTITIONER

## 2025-06-24 RX ORDER — HEPARIN SODIUM 10000 [USP'U]/100ML
0-4000 INJECTION, SOLUTION INTRAVENOUS CONTINUOUS
Status: DISCONTINUED | OUTPATIENT
Start: 2025-06-24 | End: 2025-06-26

## 2025-06-24 RX ORDER — SPIRONOLACTONE 25 MG/1
12.5 TABLET ORAL DAILY
Status: DISCONTINUED | OUTPATIENT
Start: 2025-06-25 | End: 2025-06-27 | Stop reason: HOSPADM

## 2025-06-24 RX ORDER — INSULIN LISPRO 100 [IU]/ML
0-10 INJECTION, SOLUTION INTRAVENOUS; SUBCUTANEOUS
Status: DISCONTINUED | OUTPATIENT
Start: 2025-06-25 | End: 2025-06-27 | Stop reason: HOSPADM

## 2025-06-24 RX ORDER — OXYCODONE HYDROCHLORIDE 5 MG/1
5 TABLET ORAL EVERY 6 HOURS PRN
Refills: 0 | Status: DISCONTINUED | OUTPATIENT
Start: 2025-06-24 | End: 2025-06-27 | Stop reason: HOSPADM

## 2025-06-24 RX ORDER — METOPROLOL SUCCINATE 50 MG/1
50 TABLET, EXTENDED RELEASE ORAL DAILY
Status: DISCONTINUED | OUTPATIENT
Start: 2025-06-25 | End: 2025-06-27 | Stop reason: HOSPADM

## 2025-06-24 RX ORDER — BUMETANIDE 1 MG/1
1 TABLET ORAL DAILY
Status: DISCONTINUED | OUTPATIENT
Start: 2025-06-25 | End: 2025-06-27 | Stop reason: HOSPADM

## 2025-06-24 RX ORDER — CALCIUM CARBONATE 300MG(750)
400 TABLET,CHEWABLE ORAL DAILY
COMMUNITY
Start: 2025-02-25

## 2025-06-24 RX ORDER — DEXTROSE 50 % IN WATER (D50W) INTRAVENOUS SYRINGE
12.5
Status: DISCONTINUED | OUTPATIENT
Start: 2025-06-24 | End: 2025-06-27 | Stop reason: HOSPADM

## 2025-06-24 RX ORDER — DOXYCYCLINE HYCLATE 100 MG
100 TABLET ORAL 2 TIMES DAILY
COMMUNITY
Start: 2025-06-20 | End: 2025-06-27 | Stop reason: HOSPADM

## 2025-06-24 RX ORDER — DEXTROSE 50 % IN WATER (D50W) INTRAVENOUS SYRINGE
25
Status: DISCONTINUED | OUTPATIENT
Start: 2025-06-24 | End: 2025-06-27 | Stop reason: HOSPADM

## 2025-06-24 RX ORDER — DAPAGLIFLOZIN 10 MG/1
10 TABLET, FILM COATED ORAL DAILY
Status: DISCONTINUED | OUTPATIENT
Start: 2025-06-25 | End: 2025-06-24

## 2025-06-24 RX ORDER — SENNOSIDES 8.6 MG/1
1 TABLET ORAL NIGHTLY
COMMUNITY
Start: 2025-01-22

## 2025-06-24 RX ORDER — POLYETHYLENE GLYCOL 3350 17 G/17G
17 POWDER, FOR SOLUTION ORAL
COMMUNITY
Start: 2023-10-13

## 2025-06-24 RX ORDER — ATORVASTATIN CALCIUM 40 MG/1
40 TABLET, FILM COATED ORAL NIGHTLY
Status: DISCONTINUED | OUTPATIENT
Start: 2025-06-25 | End: 2025-06-27 | Stop reason: HOSPADM

## 2025-06-24 RX ORDER — BUMETANIDE 1 MG/1
1 TABLET ORAL
COMMUNITY
Start: 2025-02-10 | End: 2025-06-27 | Stop reason: HOSPADM

## 2025-06-24 RX ORDER — LANOLIN ALCOHOL/MO/W.PET/CERES
400 CREAM (GRAM) TOPICAL DAILY
Status: DISCONTINUED | OUTPATIENT
Start: 2025-06-25 | End: 2025-06-27 | Stop reason: HOSPADM

## 2025-06-24 RX ORDER — POLYETHYLENE GLYCOL 3350 17 G/17G
17 POWDER, FOR SOLUTION ORAL DAILY
Status: DISCONTINUED | OUTPATIENT
Start: 2025-06-25 | End: 2025-06-27 | Stop reason: HOSPADM

## 2025-06-24 RX ORDER — ACETAMINOPHEN 325 MG/1
650 TABLET ORAL EVERY 6 HOURS PRN
Status: DISCONTINUED | OUTPATIENT
Start: 2025-06-24 | End: 2025-06-27 | Stop reason: HOSPADM

## 2025-06-24 RX ORDER — ACETAMINOPHEN 325 MG/1
650 TABLET ORAL EVERY 4 HOURS PRN
COMMUNITY
Start: 2025-02-10

## 2025-06-24 RX ORDER — ACETAMINOPHEN 500 MG
5 TABLET ORAL NIGHTLY PRN
Status: DISCONTINUED | OUTPATIENT
Start: 2025-06-24 | End: 2025-06-27 | Stop reason: HOSPADM

## 2025-06-24 RX ORDER — DOXYCYCLINE HYCLATE 100 MG
100 TABLET ORAL 2 TIMES DAILY
Status: DISCONTINUED | OUTPATIENT
Start: 2025-06-25 | End: 2025-06-27 | Stop reason: HOSPADM

## 2025-06-24 RX ORDER — SPIRONOLACTONE 25 MG/1
12.5 TABLET ORAL
COMMUNITY
Start: 2025-01-22 | End: 2026-01-22

## 2025-06-24 RX ORDER — SENNOSIDES 8.6 MG/1
1 TABLET ORAL NIGHTLY
Status: DISCONTINUED | OUTPATIENT
Start: 2025-06-25 | End: 2025-06-27 | Stop reason: HOSPADM

## 2025-06-24 RX ORDER — ASPIRIN 81 MG/1
81 TABLET ORAL DAILY
Status: DISCONTINUED | OUTPATIENT
Start: 2025-06-25 | End: 2025-06-27 | Stop reason: HOSPADM

## 2025-06-24 RX ORDER — SACUBITRIL AND VALSARTAN 24; 26 MG/1; MG/1
1 TABLET, FILM COATED ORAL 2 TIMES DAILY
COMMUNITY
Start: 2025-02-04

## 2025-06-24 RX ORDER — DOXYCYCLINE HYCLATE 100 MG
100 TABLET ORAL 2 TIMES DAILY
Status: DISCONTINUED | OUTPATIENT
Start: 2025-06-24 | End: 2025-06-24

## 2025-06-24 RX ADMIN — IOHEXOL 110 ML: 350 INJECTION, SOLUTION INTRAVENOUS at 18:56

## 2025-06-24 RX ADMIN — HEPARIN SODIUM 1000 UNITS/HR: 10000 INJECTION, SOLUTION INTRAVENOUS at 23:33

## 2025-06-24 RX ADMIN — DOXYCYCLINE HYCLATE 100 MG: 100 TABLET, COATED ORAL at 22:42

## 2025-06-24 ASSESSMENT — ENCOUNTER SYMPTOMS
BACK PAIN: 0
FEVER: 0
CHILLS: 0
NECK PAIN: 0
WHEEZING: 0
VOMITING: 0
DYSURIA: 0
WOUND: 0
SORE THROAT: 0
SHORTNESS OF BREATH: 1
HEADACHES: 0
DIZZINESS: 0
WEAKNESS: 0
COLOR CHANGE: 1
NAUSEA: 0
DIARRHEA: 0
ABDOMINAL PAIN: 0
PALPITATIONS: 0
NUMBNESS: 1
COUGH: 1

## 2025-06-24 ASSESSMENT — PAIN SCALES - GENERAL: PAINLEVEL_OUTOF10: 10 - WORST POSSIBLE PAIN

## 2025-06-24 ASSESSMENT — LIFESTYLE VARIABLES
HAVE PEOPLE ANNOYED YOU BY CRITICIZING YOUR DRINKING: NO
TOTAL SCORE: 0
EVER FELT BAD OR GUILTY ABOUT YOUR DRINKING: NO
EVER HAD A DRINK FIRST THING IN THE MORNING TO STEADY YOUR NERVES TO GET RID OF A HANGOVER: NO
HAVE YOU EVER FELT YOU SHOULD CUT DOWN ON YOUR DRINKING: NO

## 2025-06-24 ASSESSMENT — PAIN - FUNCTIONAL ASSESSMENT: PAIN_FUNCTIONAL_ASSESSMENT: 0-10

## 2025-06-24 ASSESSMENT — PAIN DESCRIPTION - LOCATION: LOCATION: LEG

## 2025-06-24 ASSESSMENT — PAIN DESCRIPTION - PAIN TYPE: TYPE: ACUTE PAIN

## 2025-06-24 ASSESSMENT — PAIN DESCRIPTION - ORIENTATION: ORIENTATION: RIGHT

## 2025-06-24 NOTE — ED PROVIDER NOTES
HPI   Chief Complaint   Patient presents with    Foot Pain    Leg Pain       HPI  The patient is a 69 year old female with hx of T2DM, CHF, schizophrenia, AFIB presents to the Emergency Department today with complaints of swelling, pain and numbness to the right foot radiating to the right lower leg. Symptoms have been persistent x 3 weeks. Patient was recently evaluated at [facility] ED on 6/20/25 for her symptoms, prescribed a course of Doxycyline at that time for suspected cellulitis RLE. Patient states that she did not  the course of doxycycline because she was unsure if insurance would cover the cost. She lives at home with her son and normally ambulates with use of rollator but reports increasing difficulty ambulating due to her symptoms. Denies any recent falls, injury or trauma to the RLE. Patient is unable to state what medications she takes at home. It is unclear whether she is compliant with her home Eliquis. Denies fever/chills, URI symptoms, n/v, abd pain, urinary symptoms or weakness. She endorses SOB at baseline with a moist cough;  denies any worsening difficulty breathing. No chest discomfort, palpitations, headache, dizziness, vision changes, neck pain, nausea, vomiting, abdominal pain, diarrhea, or urinary symptoms. No sick contacts.    Review of Systems   Constitutional:  Negative for chills and fever.   HENT:  Negative for congestion, ear pain and sore throat.    Respiratory:  Positive for cough and shortness of breath. Negative for wheezing.    Cardiovascular:  Positive for leg swelling. Negative for chest pain and palpitations.   Gastrointestinal:  Negative for abdominal pain, diarrhea, nausea and vomiting.   Genitourinary:  Negative for dysuria.   Musculoskeletal:  Negative for back pain and neck pain.   Skin:  Positive for color change. Negative for wound.   Neurological:  Positive for numbness. Negative for dizziness, weakness and headaches.           Patient History   Medical  History[1]  Surgical History[2]  Family History[3]  Social History[4]    Physical Exam   ED Triage Vitals [06/24/25 1243]   Temperature Heart Rate Respirations BP   36.4 °C (97.5 °F) 78 18 115/74      Pulse Ox Temp Source Heart Rate Source Patient Position   95 % Temporal Monitor Sitting      BP Location FiO2 (%)     Left arm --       Physical Exam  Vitals reviewed.   Constitutional:       General: She is not in acute distress.     Appearance: She is not ill-appearing.   HENT:      Head: Normocephalic and atraumatic.      Mouth/Throat:      Mouth: Mucous membranes are moist.      Pharynx: No oropharyngeal exudate or posterior oropharyngeal erythema.   Eyes:      General: No scleral icterus.     Extraocular Movements: Extraocular movements intact.   Cardiovascular:      Rate and Rhythm: Normal rate and regular rhythm.      Heart sounds: Normal heart sounds.   Pulmonary:      Effort: Pulmonary effort is normal. No respiratory distress.      Breath sounds: Rhonchi present. No wheezing or rales.   Abdominal:      General: Bowel sounds are normal. There is no distension.      Palpations: Abdomen is soft.      Tenderness: There is no abdominal tenderness. There is no guarding or rebound.   Musculoskeletal:         General: No signs of injury. Normal range of motion.      Cervical back: Normal range of motion and neck supple.      Right lower leg: Edema present.      Left lower leg: Edema present.      Comments: Palpable femoral pulses bilaterally. Dopplerable DP and PT pulses to BLE.   Lymphadenopathy:      Cervical: No cervical adenopathy.   Skin:     General: Skin is warm and dry.      Comments: +2  non pitting lower extremity edema bilaterally. Hyperpigmentation with discoloration to right foot and right lower leg. No overlying warmth or tenderness. Skin intact. Compartments are soft. No crepitus.   Neurological:      General: No focal deficit present.      Mental Status: She is alert and oriented to person, place, and  time.      Cranial Nerves: No cranial nerve deficit.      Sensory: No sensory deficit.      Motor: No weakness.      Comments: Pedal pulses dopplerable. Sensation intact to light touch throughout.    Psychiatric:         Mood and Affect: Mood normal.           ED Course & MDM   Diagnoses as of 06/24/25 2246   Venous stasis dermatitis of right lower extremity   Cellulitis of right lower extremity        CT angio aorta and bilateral iliofemoral runoff including without contrast if performed  Result Date: 6/24/2025  Interpreted By:  Fara [place], STUDY: CT ANGIO AORTA AND BILATERAL ILIOFEMORAL RUN OFF INCLUDING WITHOUT CONTRAST IF PERFORMED;  6/24/2025 7:07 pm   INDICATION: Signs/Symptoms:RLE pain numbness, discoloration to bilateral toes.     COMPARISON: CT abdomen and pelvis dated 10/27/2022.   ACCESSION NUMBER(S): [id number]   ORDERING CLINICIAN: TERELL MAIER   TECHNIQUE: Thin-section axial images of the abdomen, pelvis and bilateral lower extremities were obtained in the arterial phase after intravenous administration of 110 mL of Omnipaque 350 iodinated contrast. Delayed images the legs were obtained for assessment of venous patency. Coronal and sagittal reformatted images were reconstructed from the axial data. Multiplanar MIPs and 3D reconstructions were created on an independent workstation and reviewed.   FINDINGS: VASCULATURE:   ABDOMINAL AORTA: No abdominal aortic aneurysm or dissection. Minimal aortic atherosclerosis.   ABDOMINAL ARTERIES: No significant atherosclerosis or stenosis is identified within the large arteries in the abdomen. Celiac, superior mesenteric, renal and inferior mesenteric arteries are widely patent.     RIGHT LOWER EXTREMITY:   - Right Common Iliac Artery:  No hemodynamically significant stenosis. - Right External Iliac Artery:  No hemodynamically significant stenosis. - Right Internal Iliac Artery:  Semi circumferential calcified and noncalcified atherosclerotic plaque  contributes to mild-to-moderate stenosis, with preserved opacification of the vessels distally.   - Right Common Femoral Artery:  Mild stenosis is present due to semi circumferential, predominantly noncalcified atherosclerotic plaque. - Right Profunda Artery:  Multifocal areas of moderate to severe stenosis due to calcified and noncalcified atherosclerotic plaque are present in the proximal right profunda femoris artery, with preserved opacification of the its more distal branches. - Right Superficial Femoral Artery:  Multifocal areas of mild-to-moderate stenosis due to calcified plaque are present throughout the proximal and mid right superficial femoral artery, with area of focal high-grade stenosis present within the vessel distally (series 401, image 663), with preserved enhancement of the more distal segment. - Right Popliteal Artery:  Mild stenosis is present in the right popliteal artery due to calcified and noncalcified atherosclerotic plaque. - Right Anterior Tibial, Posterior Tibial, Peroneal Arteries: Only the proximal most segment of the anterior tibial artery demonstrates opacification, with no definite enhancement identified in the distal anterior tibial artery in the lower leg. Multifocal areas of moderate to high-grade stenosis are present within the tibioperoneal trunk, with the enhancement of the posterior tibial and peroneal arteries to the ankle, although there are likely areas of mild-to-moderate stenosis present in the both vessels due to calcified and noncalcified atherosclerotic plaques..       LEFT LOWER EXTREMITY:   - Left Common Iliac Artery:  Mild stenosis is present in the left common iliac artery due to semi circumferential plaque. - Left External Iliac Artery:  No hemodynamically significant stenosis. - Left Internal Iliac Artery:  Focal moderate and high-grade stenosis may be present in the mid and distal left internal iliac artery, with some enhancement of the more distal branches.    - Left Common Femoral Artery:  Mild stenosis is present in the distal left common femoral artery due to atherosclerotic plaque. - Left Profunda Artery:  Moderate stenosis is present in the proximal left profunda femoris artery due to calcified plaque. - Left Superficial Femoral Artery:  Multifocal areas of mild to moderate stenosis are present in the proximal and distal left superficial femoral artery, with preserved enhancement of the vessels more distally. - Left Popliteal Artery:  Mild-to-moderate stenosis is present in the left popliteal artery due to calcified atherosclerotic plaque. There is area of suspected high-grade stenosis in the vessel distally. - Left Anterior Tibial, Posterior Tibial, Peroneal Arteries:  Examination of the three-vessel runoff in the left calf is significantly limited due to extensive vessel calcifications. There may be some faint enhancement present in the distal posterior tibial artery and anterior tibial artery, with no appreciable enhancement identified in the distal left peroneal artery.         ABDOMEN/PELVIS:   LIVER: No acute hepatic parenchymal abnormality is identified, although the liver is poorly opacify due to contrast bolus timing.   BILE DUCTS: No intrahepatic or extrahepatic biliary dilatation is present.   GALLBLADDER: Gallbladder is surgically absent.   PANCREAS: No pancreatic ductal dilatation or peripancreatic stranding is present.   SPLEEN: Several round hypodense lesions are again visualized within the spleen, similar to prior exam in October of 2022, possibly representing epithelial cysts, but incompletely characterized on current study.   No new abnormalities are identified in the spleen.   ADRENALS: Adrenal glands are similar in appearance to prior exam without evidence of acute abnormalities.   KIDNEYS, URETERS, BLADDER: Kidneys are symmetric in size and enhancement without evidence of hydronephrosis or radiopaque nephrolithiasis. Several small hypodensities  in the cortex bilaterally likely represent cysts.   Upper ureters are unremarkable in appearance.   Mild bladder wall thickening may be due to incomplete distention.   REPRODUCTIVE ORGANS: Uterus is present. No adnexal masses or fluid collections are identified.   VESSELS: See above. No additional significant abnormality.   RETROPERITONEUM/LYMPH NODES: No acute retroperitoneal abnormality. No enlarged lymph nodes.   BOWEL/PERITONEUM: Stomach is unremarkable in appearance. No inflammatory bowel wall thickening or dilatation. Appendix is not definitely identified, although no secondary signs of acute appendicitis are present in the its expected location in the right lower quadrant.   Small amount of free fluid/ascites is present in the abdomen. No free air or thick-walled collections are identified.     MUSCULOSKELETAL/ABDOMINAL WALL: No acute osseous abnormality or suspicious lesion. There is diffuse edema/fluid present in the cutaneous tissues of the visualized lower chest, abdomen, pelvis and lower extremities suggestive of 3rd spacing.   LOWER CHEST: Included lung bases are clear without evidence of consolidation or sizable effusion.   There is mild 4 chamber cardiomegaly without sizable pericardial effusion.   Distal esophagus is unremarkable in appearance.       VASCULAR: 1. Extensive atherosclerotic changes are present in the artery supplying the right lower extremity, including absent opacification of the right anterior tibial artery in the mid and distal right calf, and areas of likely focal high-grade stenosis present within the distal right superficial femoral artery and right profunda femoris artery. 2. Atherosclerotic plaques are present throughout the left lower extremity, with areas of moderate and focal high-grade stenosis present in the left superficial femoral and left popliteal arteries. Faint enhancement is present in the distal anterior and posterior tibial arteries in the lower calf, although no  significant enhancement is identified in the left peroneal artery.   NONVASCULAR: 1. Mild 4 chamber cardiomegaly, with diffuse edema/fluid present in the cutaneous tissues of the visualized lower chest, abdomen, pelvis and lower extremity and small amount of free fluid in the abdomen/pelvis, suggestive of 3rd spacing. Correlate with fluid volume status. 2. Mild bladder wall thickening may be due to incomplete distention, although correlation with urinalysis to exclude any underlying cystitis is recommended. 3. Several rounded hypodensities in the spleen are similar in appearance to prior exam in October of 2022, likely representing benign process, possibly epithelial cysts given the stability to prior study, although incompletely characterized on current exam.   MACRO: None.   Signed by: Fara Beach 6/24/2025 7:38 PM Dictation workstation:   [id number]    XR foot right 3+ views  Result Date: 6/24/2025  STUDY: Right foot and ankle radiographs; 06/24/2025; 05:57PM. INDICATION: Pain. COMPARISON: None Available. ACCESSION NUMBER(S): [id number], [id number] ORDERING CLINICIAN: TECHNIQUE:  Three views of the right foot and four views of the right ankle. FINDINGS:  Right Foot:  There is no displaced fracture.  The alignment is anatomic.  No soft tissue abnormality is seen. Mild degenerative changes. Small heel spur. Right Ankle:  There is no displaced fracture.  The alignment is anatomic.  No soft tissue abnormality is seen. Mild degenerative changes.    Mild degenerative changes. No acute osseous abnormality. Small heel spur. Signed by Steve Johnson MD    XR ankle right 3+ views  Result Date: 6/24/2025  STUDY: Right foot and ankle radiographs; 06/24/2025; 05:57PM. INDICATION: Pain. COMPARISON: None Available. ACCESSION NUMBER(S): [id number], [id number] ORDERING CLINICIAN: TECHNIQUE:  Three views of the right foot and four views of the right ankle. FINDINGS:  Right Foot:  There is no displaced fracture.   The alignment is anatomic.  No soft tissue abnormality is seen. Mild degenerative changes. Small heel spur. Right Ankle:  There is no displaced fracture.  The alignment is anatomic.  No soft tissue abnormality is seen. Mild degenerative changes.    Mild degenerative changes. No acute osseous abnormality. Small heel spur. Signed by Steve Johnson MD    XR chest 2 views  Result Date: 6/24/2025  STUDY: Chest Radiographs;  6/24/2025 at 5:57 PM. INDICATION: Cough. COMPARISON: XR chest 8/23/2024, 11/1/2022. ACCESSION NUMBER(S): [id number] ORDERING CLINICIAN: TERELL MAIER TECHNIQUE:  Frontal and lateral chest. FINDINGS: CARDIOMEDIASTINAL SILHOUETTE: Heart is enlarged with pulmonary vascular congestion.  LUNGS: Lungs are clear.  ABDOMEN: No remarkable upper abdominal findings.  BONES: No acute osseous changes.    Cardiomegaly and pulmonary vascular congestion. Signed by Steve Johnson MD    Lower extremity venous duplex right  Result Date: 6/24/2025  Interpreted By:  Marcial Gutierrez and Benza Andrew STUDY: [facility] US LOWER EXTREMITY VENOUS DUPLEX RIGHT;  6/24/2025 4:57 pm   INDICATION: Signs/Symptoms:RLE pain, swelling.     COMPARISON: None.   ACCESSION NUMBER(S): [id number]   ORDERING CLINICIAN: TERELL MAIER   TECHNIQUE: Vascular ultrasound of the right lower extremity was performed. Real-time compression views as well as Gray scale, color Doppler and spectral Doppler waveform analysis was performed.   FINDINGS: Evaluation of the visualized portions of the right common femoral vein, proximal, mid, and distal femoral vein, and popliteal vein were performed.  Evaluation of the visualized portions of the  posterior tibial and peroneal veins were also performed.   Limitations:  Body habitus and lower extremity edema   The evaluated veins demonstrate normal compressibility. There is intact venous flow demonstrating normal respiratory variability and normal augmentation of flow with calf compression. Therefore, there is no  ultrasonographic evidence for deep vein thrombosis within the evaluated veins.       No sonographic evidence for deep vein thrombosis within the evaluated veins of the right lower extremity.   I personally reviewed the images/study and I agree with the findings as stated above by resident physician, Fer Valdovinos MD. This study was interpreted at [place], [place].   MACRO: None   Signed by: Marcial Gutierrez 6/24/2025 6:26 PM Dictation workstation:   [id number]            No data recorded     [place] Coma Scale Score: 15 (06/24/25 1244 : Filippo Stevenson RN)                       Results for orders placed or performed during the hospital encounter of 06/24/25 (from the past 24 hours)   CBC and Auto Differential   Result Value Ref Range    WBC 11.3 4.4 - 11.3 x10*3/uL    nRBC 0.0 0.0 - 0.0 /100 WBCs    RBC 5.87 (H) 4.00 - 5.20 x10*6/uL    Hemoglobin 16.6 (H) 12.0 - 16.0 g/dL    Hematocrit 51.3 (H) 36.0 - 46.0 %    MCV 87 80 - 100 fL    MCH 28.3 26.0 - 34.0 pg    MCHC 32.4 32.0 - 36.0 g/dL    RDW 16.1 (H) 11.5 - 14.5 %    Platelets 241 150 - 450 x10*3/uL    Neutrophils % 73.5 40.0 - 80.0 %    Immature Granulocytes %, Automated 1.1 (H) 0.0 - 0.9 %    Lymphocytes % 14.1 13.0 - 44.0 %    Monocytes % 10.8 2.0 - 10.0 %    Eosinophils % 0.1 0.0 - 6.0 %    Basophils % 0.4 0.0 - 2.0 %    Neutrophils Absolute 8.28 (H) 1.20 - 7.70 x10*3/uL    Immature Granulocytes Absolute, Automated 0.12 0.00 - 0.70 x10*3/uL    Lymphocytes Absolute 1.59 1.20 - 4.80 x10*3/uL    Monocytes Absolute 1.22 (H) 0.10 - 1.00 x10*3/uL    Eosinophils Absolute 0.01 0.00 - 0.70 x10*3/uL    Basophils Absolute 0.05 0.00 - 0.10 x10*3/uL   Comprehensive metabolic panel   Result Value Ref Range    Glucose 122 (H) 74 - 99 mg/dL    Sodium 134 (L) 136 - 145 mmol/L    Potassium 4.4 3.5 - 5.3 mmol/L    Chloride 102 98 - 107 mmol/L    Bicarbonate 24 21 - 32 mmol/L    Anion Gap 12 10 - 20 mmol/L    Urea Nitrogen 14 6 - 23  mg/dL    Creatinine 0.50 0.50 - 1.05 mg/dL    eGFR >90 >60 mL/min/1.73m*2    Calcium 8.6 8.6 - 10.6 mg/dL    Albumin 2.6 (L) 3.4 - 5.0 g/dL    Alkaline Phosphatase 148 (H) 33 - 136 U/L    Total Protein 6.9 6.4 - 8.2 g/dL    AST 19 9 - 39 U/L    Bilirubin, Total 1.5 (H) 0.0 - 1.2 mg/dL    ALT 11 7 - 45 U/L   Coagulation Screen   Result Value Ref Range    Protime 16.7 (H) 9.8 - 12.4 seconds    INR 1.5 (H) 0.9 - 1.1    aPTT 27 26 - 36 seconds   Sedimentation rate, automated   Result Value Ref Range    Sedimentation Rate 22 0 - 30 mm/h   C-reactive protein   Result Value Ref Range    C-Reactive Protein 8.38 (H) <1.00 mg/dL   B-type natriuretic peptide   Result Value Ref Range     (H) 0 - 99 pg/mL   Sars-CoV-2 and Influenza A/B PCR   Result Value Ref Range    Flu A Result Not Detected Not Detected    Flu B Result Not Detected Not Detected    Coronavirus 2019, PCR Not Detected Not Detected   Urinalysis with Reflex Culture and Microscopic   Result Value Ref Range    Color, Urine Yellow Light-Yellow, Yellow, Dark-Yellow    Appearance, Urine Clear Clear    Specific Gravity, Urine >1.050 (N) 1.005 - 1.035    pH, Urine 6.0 5.0, 5.5, 6.0, 6.5, 7.0, 7.5, 8.0    Protein, Urine 300 (3+) (A) NEGATIVE, 10 (TRACE), 20 (TRACE) mg/dL    Glucose, Urine 30 (TRACE) (A) Normal mg/dL    Blood, Urine 0.2 (2+) (A) NEGATIVE mg/dL    Ketones, Urine TRACE (A) NEGATIVE mg/dL    Bilirubin, Urine NEGATIVE NEGATIVE mg/dL    Urobilinogen, Urine 3 (1+) (A) Normal mg/dL    Nitrite, Urine NEGATIVE NEGATIVE    Leukocyte Esterase, Urine NEGATIVE NEGATIVE   Urinalysis Microscopic   Result Value Ref Range    WBC, Urine 11-20 (A) 1-5, NONE /HPF    RBC, Urine 11-20 (A) NONE, 1-2, 3-5 /HPF    Squamous Epithelial Cells, Urine 1-9 (SPARSE) Reference range not established. /HPF    Mucus, Urine FEW Reference range not established. /LPF        Medical Decision Making  This is a 69 year old female with hx of T2DM, CHF, Afib (noncompliant with Eliquis)  presenting with concern for claudication to the right foot and right lower leg. Patient reports increased pain and some paresthesias over the last few days with difficulty ambulating using rollator. Patient was recently evaluated at [facility] a few days ago for her symptoms, diagnosed with suspected cellulitis and written a script for doxycyline. However, the patient reports that she has not yet picked up the doxycycline due to concern for cost issues. Upon arrival to the ED, the patient is afebrile, resting comfortably without distress.She has  +2 bilateral nonpitting lower extremity edema. Pedal pulses dopplerable bilaterally. Patient has discoloration and hyperpigmentation to right foot and RLE with minimal erythema. No warmth. Differential diagnosis is broad and includes but not limited to claudication, DVT, arterial occlusion, PVD, PAD, cellulitis, fracture, venous stasis, infection, or electrolyte abnormality. Labwork and imaging were ordered for further evaluation US of the RLE was negative for DVT. Xrays of the right foot and ankle with mild degenerative changes. No acute osseous abnormality. CT angio with runoffs was read by radiology as showing extensive atherosclerotic changes in the artery supplying the right lower extremity including absent opacification of the right anterior tibial artery in the mid and distal right calf and stenosis within the distal right superficial femoral artery and right profunda femoris artery. Vascular surgery consulted for patient evaluation. Per vascular surgery who evaluated the patient in the ED, there is no acute surgical intervention at this time. Vascular surgery recommends stopping Eliquis (which patient is not sure if she is even taking) and beginning low intensity heparin gtt instead. No acute surgical intervention and no concern for ischemic limb per vascular surgery. They feel like patient's skin changes are likely secondary to statis dermatitis. Will admit patient to  medicine service for heparin gtt and further management and monitoring of symptoms, PT/OT. Patient is agreeable with hospital admission and remains in stable condition while awaiting to be admitted in the hospital.     Procedure  Procedures         BRUCE Torres-FELICIA  06/24/25 2241       BRUCE Torres-FELICIA  06/24/25 2242       BRUCE Torres-FELICIA  06/24/25 2246       BRUCE Torres-CNP  06/24/25 2113    This patient was seen by the advanced practice provider.  I have personally performed a substantive portion of the encounter.  I have seen and examined the patient; agree with the workup, evaluation, MDM, management and diagnosis.  The care plan has been discussed.      I personally saw the patient and made/approved the management plan and take responsibility for the patient management.    Patient was signed out to my colleague, Dr. Brett Isabel at 4 PM pending all her labwork, imaging studies, and pending the remainder of her ED course and final disposition.     Candace Gates MD         [1] No past medical history on file.  [2] No past surgical history on file.  [3] No family history on file.  [4]   Social History  Tobacco Use    Smoking status: Never    Smokeless tobacco: Never   Substance Use Topics    Alcohol use: Not on file    Drug use: Not on file        Candace Gates MD  06/25/25 5719

## 2025-06-24 NOTE — ED TRIAGE NOTES
Patient to ED with c/o R leg pain, numbness, hard to walk on x 3 weeks. PMH DM2, HF, compliant on meds. Pain 10/10 to affected leg. Denies trauma to area.

## 2025-06-25 ENCOUNTER — APPOINTMENT (OUTPATIENT)
Dept: VASCULAR MEDICINE | Facility: HOSPITAL | Age: 70
End: 2025-06-25
Payer: MEDICAID

## 2025-06-25 LAB
ALBUMIN SERPL BCP-MCNC: 2.2 G/DL (ref 3.4–5)
ANION GAP SERPL CALC-SCNC: 12 MMOL/L (ref 10–20)
BUN SERPL-MCNC: 18 MG/DL (ref 6–23)
CALCIUM SERPL-MCNC: 7.9 MG/DL (ref 8.6–10.6)
CHLORIDE SERPL-SCNC: 99 MMOL/L (ref 98–107)
CO2 SERPL-SCNC: 26 MMOL/L (ref 21–32)
CREAT SERPL-MCNC: 0.58 MG/DL (ref 0.5–1.05)
EGFRCR SERPLBLD CKD-EPI 2021: >90 ML/MIN/1.73M*2
ERYTHROCYTE [DISTWIDTH] IN BLOOD BY AUTOMATED COUNT: 16.3 % (ref 11.5–14.5)
EST. AVERAGE GLUCOSE BLD GHB EST-MCNC: 148 MG/DL
GLUCOSE BLD MANUAL STRIP-MCNC: 122 MG/DL (ref 74–99)
GLUCOSE BLD MANUAL STRIP-MCNC: 126 MG/DL (ref 74–99)
GLUCOSE BLD MANUAL STRIP-MCNC: 149 MG/DL (ref 74–99)
GLUCOSE BLD MANUAL STRIP-MCNC: 164 MG/DL (ref 74–99)
GLUCOSE BLD MANUAL STRIP-MCNC: 192 MG/DL (ref 74–99)
GLUCOSE BLD MANUAL STRIP-MCNC: 195 MG/DL (ref 74–99)
GLUCOSE SERPL-MCNC: 124 MG/DL (ref 74–99)
HBA1C MFR BLD: 6.8 % (ref ?–5.7)
HCT VFR BLD AUTO: 43.5 % (ref 36–46)
HGB BLD-MCNC: 13.5 G/DL (ref 12–16)
HOLD SPECIMEN: NORMAL
MAGNESIUM SERPL-MCNC: 1.75 MG/DL (ref 1.6–2.4)
MCH RBC QN AUTO: 27.9 PG (ref 26–34)
MCHC RBC AUTO-ENTMCNC: 31 G/DL (ref 32–36)
MCV RBC AUTO: 90 FL (ref 80–100)
NRBC BLD-RTO: 0 /100 WBCS (ref 0–0)
PHOSPHATE SERPL-MCNC: 3.2 MG/DL (ref 2.5–4.9)
PLATELET # BLD AUTO: 203 X10*3/UL (ref 150–450)
POTASSIUM SERPL-SCNC: 4.2 MMOL/L (ref 3.5–5.3)
RBC # BLD AUTO: 4.84 X10*6/UL (ref 4–5.2)
SODIUM SERPL-SCNC: 133 MMOL/L (ref 136–145)
UFH PPP CHRO-ACNC: 0.4 IU/ML (ref ?–1.1)
UFH PPP CHRO-ACNC: 0.4 IU/ML (ref ?–1.1)
UFH PPP CHRO-ACNC: 0.5 IU/ML (ref ?–1.1)
WBC # BLD AUTO: 14.7 X10*3/UL (ref 4.4–11.3)

## 2025-06-25 PROCEDURE — 2500000004 HC RX 250 GENERAL PHARMACY W/ HCPCS (ALT 636 FOR OP/ED): Mod: SE | Performed by: NURSE PRACTITIONER

## 2025-06-25 PROCEDURE — 93922 UPR/L XTREMITY ART 2 LEVELS: CPT | Performed by: INTERNAL MEDICINE

## 2025-06-25 PROCEDURE — 2500000001 HC RX 250 WO HCPCS SELF ADMINISTERED DRUGS (ALT 637 FOR MEDICARE OP): Mod: SE | Performed by: NURSE PRACTITIONER

## 2025-06-25 PROCEDURE — 82947 ASSAY GLUCOSE BLOOD QUANT: CPT

## 2025-06-25 PROCEDURE — 85520 HEPARIN ASSAY: CPT | Performed by: NURSE PRACTITIONER

## 2025-06-25 PROCEDURE — 36415 COLL VENOUS BLD VENIPUNCTURE: CPT | Performed by: NURSE PRACTITIONER

## 2025-06-25 PROCEDURE — 97165 OT EVAL LOW COMPLEX 30 MIN: CPT | Mod: GO

## 2025-06-25 PROCEDURE — 2500000002 HC RX 250 W HCPCS SELF ADMINISTERED DRUGS (ALT 637 FOR MEDICARE OP, ALT 636 FOR OP/ED): Mod: SE | Performed by: NURSE PRACTITIONER

## 2025-06-25 PROCEDURE — 85027 COMPLETE CBC AUTOMATED: CPT | Performed by: NURSE PRACTITIONER

## 2025-06-25 PROCEDURE — 97161 PT EVAL LOW COMPLEX 20 MIN: CPT | Mod: GP

## 2025-06-25 PROCEDURE — 99232 SBSQ HOSP IP/OBS MODERATE 35: CPT | Performed by: STUDENT IN AN ORGANIZED HEALTH CARE EDUCATION/TRAINING PROGRAM

## 2025-06-25 PROCEDURE — 83735 ASSAY OF MAGNESIUM: CPT | Performed by: NURSE PRACTITIONER

## 2025-06-25 PROCEDURE — 1100000001 HC PRIVATE ROOM DAILY

## 2025-06-25 PROCEDURE — 93922 UPR/L XTREMITY ART 2 LEVELS: CPT

## 2025-06-25 PROCEDURE — 80069 RENAL FUNCTION PANEL: CPT | Performed by: NURSE PRACTITIONER

## 2025-06-25 RX ORDER — DAPAGLIFLOZIN 10 MG/1
10 TABLET, FILM COATED ORAL DAILY
Status: DISCONTINUED | OUTPATIENT
Start: 2025-06-25 | End: 2025-06-27 | Stop reason: HOSPADM

## 2025-06-25 RX ADMIN — OXYCODONE 5 MG: 5 TABLET ORAL at 17:22

## 2025-06-25 RX ADMIN — BUMETANIDE 1 MG: 1 TABLET ORAL at 09:10

## 2025-06-25 RX ADMIN — ASPIRIN 81 MG: 81 TABLET, COATED ORAL at 09:10

## 2025-06-25 RX ADMIN — SACUBITRIL AND VALSARTAN 1 TABLET: 24; 26 TABLET, FILM COATED ORAL at 09:10

## 2025-06-25 RX ADMIN — SPIRONOLACTONE 12.5 MG: 25 TABLET, FILM COATED ORAL at 09:10

## 2025-06-25 RX ADMIN — DOXYCYCLINE HYCLATE 100 MG: 100 TABLET, FILM COATED ORAL at 20:52

## 2025-06-25 RX ADMIN — SACUBITRIL AND VALSARTAN 1 TABLET: 24; 26 TABLET, FILM COATED ORAL at 20:52

## 2025-06-25 RX ADMIN — MAGNESIUM OXIDE TAB 400 MG (241.3 MG ELEMENTAL MG) 1 TABLET: 400 (241.3 MG) TAB at 09:10

## 2025-06-25 RX ADMIN — ATORVASTATIN CALCIUM 40 MG: 40 TABLET, FILM COATED ORAL at 20:51

## 2025-06-25 RX ADMIN — DAPAGLIFLOZIN 10 MG: 10 TABLET, FILM COATED ORAL at 09:10

## 2025-06-25 RX ADMIN — DOXYCYCLINE HYCLATE 100 MG: 100 TABLET, FILM COATED ORAL at 09:11

## 2025-06-25 RX ADMIN — HEPARIN SODIUM 1000 UNITS/HR: 10000 INJECTION, SOLUTION INTRAVENOUS at 20:58

## 2025-06-25 RX ADMIN — SENNOSIDES 8.6 MG: 8.6 TABLET, FILM COATED ORAL at 20:51

## 2025-06-25 RX ADMIN — METOPROLOL SUCCINATE 50 MG: 50 TABLET, EXTENDED RELEASE ORAL at 09:10

## 2025-06-25 SDOH — SOCIAL STABILITY: SOCIAL INSECURITY: ARE THERE ANY APPARENT SIGNS OF INJURIES/BEHAVIORS THAT COULD BE RELATED TO ABUSE/NEGLECT?: NO

## 2025-06-25 SDOH — SOCIAL STABILITY: SOCIAL INSECURITY: DOES ANYONE TRY TO KEEP YOU FROM HAVING/CONTACTING OTHER FRIENDS OR DOING THINGS OUTSIDE YOUR HOME?: NO

## 2025-06-25 SDOH — ECONOMIC STABILITY: FOOD INSECURITY: WITHIN THE PAST 12 MONTHS, YOU WORRIED THAT YOUR FOOD WOULD RUN OUT BEFORE YOU GOT THE MONEY TO BUY MORE.: NEVER TRUE

## 2025-06-25 SDOH — SOCIAL STABILITY: SOCIAL INSECURITY
WITHIN THE LAST YEAR, HAVE YOU BEEN KICKED, HIT, SLAPPED, OR OTHERWISE PHYSICALLY HURT BY YOUR PARTNER OR EX-PARTNER?: NO

## 2025-06-25 SDOH — ECONOMIC STABILITY: HOUSING INSECURITY: IN THE PAST 12 MONTHS, HOW MANY TIMES HAVE YOU MOVED WHERE YOU WERE LIVING?: 0

## 2025-06-25 SDOH — SOCIAL STABILITY: SOCIAL INSECURITY: ARE YOU OR HAVE YOU BEEN THREATENED OR ABUSED PHYSICALLY, EMOTIONALLY, OR SEXUALLY BY ANYONE?: NO

## 2025-06-25 SDOH — SOCIAL STABILITY: SOCIAL INSECURITY: WITHIN THE LAST YEAR, HAVE YOU BEEN AFRAID OF YOUR PARTNER OR EX-PARTNER?: NO

## 2025-06-25 SDOH — SOCIAL STABILITY: SOCIAL INSECURITY: WITHIN THE LAST YEAR, HAVE YOU BEEN HUMILIATED OR EMOTIONALLY ABUSED IN OTHER WAYS BY YOUR PARTNER OR EX-PARTNER?: NO

## 2025-06-25 SDOH — ECONOMIC STABILITY: HOUSING INSECURITY: AT ANY TIME IN THE PAST 12 MONTHS, WERE YOU HOMELESS OR LIVING IN A SHELTER (INCLUDING NOW)?: NO

## 2025-06-25 SDOH — ECONOMIC STABILITY: HOUSING INSECURITY: IN THE LAST 12 MONTHS, WAS THERE A TIME WHEN YOU WERE NOT ABLE TO PAY THE MORTGAGE OR RENT ON TIME?: NO

## 2025-06-25 SDOH — SOCIAL STABILITY: SOCIAL INSECURITY: ABUSE: ADULT

## 2025-06-25 SDOH — SOCIAL STABILITY: SOCIAL INSECURITY
WITHIN THE LAST YEAR, HAVE YOU BEEN RAPED OR FORCED TO HAVE ANY KIND OF SEXUAL ACTIVITY BY YOUR PARTNER OR EX-PARTNER?: NO

## 2025-06-25 SDOH — SOCIAL STABILITY: SOCIAL INSECURITY: DO YOU FEEL ANYONE HAS EXPLOITED OR TAKEN ADVANTAGE OF YOU FINANCIALLY OR OF YOUR PERSONAL PROPERTY?: NO

## 2025-06-25 SDOH — ECONOMIC STABILITY: FOOD INSECURITY: HOW HARD IS IT FOR YOU TO PAY FOR THE VERY BASICS LIKE FOOD, HOUSING, MEDICAL CARE, AND HEATING?: NOT HARD AT ALL

## 2025-06-25 SDOH — ECONOMIC STABILITY: FOOD INSECURITY: WITHIN THE PAST 12 MONTHS, THE FOOD YOU BOUGHT JUST DIDN'T LAST AND YOU DIDN'T HAVE MONEY TO GET MORE.: NEVER TRUE

## 2025-06-25 SDOH — ECONOMIC STABILITY: INCOME INSECURITY: IN THE PAST 12 MONTHS HAS THE ELECTRIC, GAS, OIL, OR WATER COMPANY THREATENED TO SHUT OFF SERVICES IN YOUR HOME?: NO

## 2025-06-25 SDOH — SOCIAL STABILITY: SOCIAL INSECURITY: WERE YOU ABLE TO COMPLETE ALL THE BEHAVIORAL HEALTH SCREENINGS?: YES

## 2025-06-25 SDOH — ECONOMIC STABILITY: TRANSPORTATION INSECURITY: IN THE PAST 12 MONTHS, HAS LACK OF TRANSPORTATION KEPT YOU FROM MEDICAL APPOINTMENTS OR FROM GETTING MEDICATIONS?: YES

## 2025-06-25 SDOH — SOCIAL STABILITY: SOCIAL INSECURITY: HAS ANYONE EVER THREATENED TO HURT YOUR FAMILY OR YOUR PETS?: NO

## 2025-06-25 SDOH — SOCIAL STABILITY: SOCIAL INSECURITY: HAVE YOU HAD THOUGHTS OF HARMING ANYONE ELSE?: NO

## 2025-06-25 SDOH — SOCIAL STABILITY: SOCIAL INSECURITY: HAVE YOU HAD ANY THOUGHTS OF HARMING ANYONE ELSE?: NO

## 2025-06-25 SDOH — SOCIAL STABILITY: SOCIAL INSECURITY: DO YOU FEEL UNSAFE GOING BACK TO THE PLACE WHERE YOU ARE LIVING?: NO

## 2025-06-25 ASSESSMENT — COGNITIVE AND FUNCTIONAL STATUS - GENERAL
STANDING UP FROM CHAIR USING ARMS: A LITTLE
DRESSING REGULAR UPPER BODY CLOTHING: A LITTLE
DAILY ACTIVITIY SCORE: 22
DRESSING REGULAR UPPER BODY CLOTHING: A LITTLE
TURNING FROM BACK TO SIDE WHILE IN FLAT BAD: A LITTLE
STANDING UP FROM CHAIR USING ARMS: A LITTLE
CLIMB 3 TO 5 STEPS WITH RAILING: A LOT
CLIMB 3 TO 5 STEPS WITH RAILING: A LOT
WALKING IN HOSPITAL ROOM: A LITTLE
MOBILITY SCORE: 18
MOVING TO AND FROM BED TO CHAIR: A LITTLE
PATIENT BASELINE BEDBOUND: NO
STANDING UP FROM CHAIR USING ARMS: A LITTLE
PERSONAL GROOMING: A LITTLE
DAILY ACTIVITIY SCORE: 17
DRESSING REGULAR LOWER BODY CLOTHING: A LITTLE
WALKING IN HOSPITAL ROOM: A LITTLE
WALKING IN HOSPITAL ROOM: A LITTLE
MOVING TO AND FROM BED TO CHAIR: A LITTLE
TOILETING: A LITTLE
TOILETING: A LITTLE
HELP NEEDED FOR BATHING: A LOT
DAILY ACTIVITIY SCORE: 20
TURNING FROM BACK TO SIDE WHILE IN FLAT BAD: A LITTLE
DRESSING REGULAR LOWER BODY CLOTHING: A LITTLE
CLIMB 3 TO 5 STEPS WITH RAILING: TOTAL
MOVING TO AND FROM BED TO CHAIR: A LITTLE
MOBILITY SCORE: 16
TOILETING: A LOT
TURNING FROM BACK TO SIDE WHILE IN FLAT BAD: A LITTLE
MOVING FROM LYING ON BACK TO SITTING ON SIDE OF FLAT BED WITH BEDRAILS: A LITTLE
HELP NEEDED FOR BATHING: A LITTLE
MOBILITY SCORE: 18
HELP NEEDED FOR BATHING: A LITTLE

## 2025-06-25 ASSESSMENT — LIFESTYLE VARIABLES
HOW OFTEN DO YOU HAVE A DRINK CONTAINING ALCOHOL: NEVER
HOW OFTEN DO YOU HAVE 6 OR MORE DRINKS ON ONE OCCASION: NEVER
HOW MANY STANDARD DRINKS CONTAINING ALCOHOL DO YOU HAVE ON A TYPICAL DAY: PATIENT DOES NOT DRINK
AUDIT-C TOTAL SCORE: 0
AUDIT-C TOTAL SCORE: 0
SKIP TO QUESTIONS 9-10: 1

## 2025-06-25 ASSESSMENT — ACTIVITIES OF DAILY LIVING (ADL)
BATHING_ASSISTANCE: MODERATE
ADL_ASSISTANCE: NEEDS ASSISTANCE
HEARING - RIGHT EAR: FUNCTIONAL
FEEDING YOURSELF: INDEPENDENT
ADEQUATE_TO_COMPLETE_ADL: YES
EFFECT OF PAIN ON DAILY ACTIVITIES: INABILITY TO AMBULATE
WALKS IN HOME: NEEDS ASSISTANCE
LACK_OF_TRANSPORTATION: YES
ADL_ASSISTANCE: NEEDS ASSISTANCE
BATHING: NEEDS ASSISTANCE
PATIENT'S MEMORY ADEQUATE TO SAFELY COMPLETE DAILY ACTIVITIES?: YES
LACK_OF_TRANSPORTATION: NO
HEARING - LEFT EAR: FUNCTIONAL
GROOMING: INDEPENDENT
ASSISTIVE_DEVICE: WALKER;WHEELCHAIR
LACK_OF_TRANSPORTATION: YES
TOILETING: NEEDS ASSISTANCE
DRESSING YOURSELF: NEEDS ASSISTANCE
JUDGMENT_ADEQUATE_SAFELY_COMPLETE_DAILY_ACTIVITIES: YES

## 2025-06-25 ASSESSMENT — PAIN - FUNCTIONAL ASSESSMENT
PAIN_FUNCTIONAL_ASSESSMENT: 0-10

## 2025-06-25 ASSESSMENT — PAIN SCALES - GENERAL
PAINLEVEL_OUTOF10: 7
PAINLEVEL_OUTOF10: 0 - NO PAIN
PAINLEVEL_OUTOF10: 10 - WORST POSSIBLE PAIN
PAINLEVEL_OUTOF10: 10 - WORST POSSIBLE PAIN
PAINLEVEL_OUTOF10: 0 - NO PAIN
PAINLEVEL_OUTOF10: 10 - WORST POSSIBLE PAIN
PAINLEVEL_OUTOF10: 10 - WORST POSSIBLE PAIN

## 2025-06-25 ASSESSMENT — ENCOUNTER SYMPTOMS
FEVER: 0
DIFFICULTY URINATING: 0
DYSURIA: 0
DIZZINESS: 0
HEADACHES: 0
LIGHT-HEADEDNESS: 0
VOMITING: 0
COUGH: 1
COLOR CHANGE: 1
NAUSEA: 0
DIARRHEA: 0
CHILLS: 0
SHORTNESS OF BREATH: 1
CONFUSION: 0
ABDOMINAL PAIN: 0
WEAKNESS: 1

## 2025-06-25 ASSESSMENT — PATIENT HEALTH QUESTIONNAIRE - PHQ9
SUM OF ALL RESPONSES TO PHQ9 QUESTIONS 1 & 2: 0
2. FEELING DOWN, DEPRESSED OR HOPELESS: NOT AT ALL
1. LITTLE INTEREST OR PLEASURE IN DOING THINGS: NOT AT ALL

## 2025-06-25 NOTE — CONSULTS
"    VASCULAR SURGERY CONSULT NOTE    Assessment/Plan   Eloy Li is 69 y.o. female with history of HTN, DM2, schizophrenia, HFrEF, CAD, afib s/p cardioversion 1/2025, known RLE stasis dermatitis who presents to ED due to 3 weeks of worsening RLE pain. Vascular surgery was consulted for further evaluation.   Patient with dopplerable signals in RLE, sensorimotor intact without evidence of tissue loss. CTA w/ runoff demonstrates atherosclerotic changes in LLE along with atherosclerotic changes in RLE in AT, as well as stenosis within R SFA and profunda. No evidence of acute occlusive disease. Patient with minimal symptoms in LLE at this time.     Plan:  Skin changes over RLE appear to be related to stasis dermatitis   Patient takes eliquis at home, would recommend transitioning to heparin gtt  Obtain GABRIELA/PVR  Rest of care as per primary team  Vascular surgery will continue to follow     Dw vascular surgery fellow Dr. Valerio who d/w attending surgeon Dr. Kimberly Flores PGY2  Vascular Surgery     Subjective   HPI:  Eloy Li is 69 y.o. female with history of HTN, DM2, schizophrenia, HFrEF, CAD, afib s/p cardioversion 1/2025, known RLE stasis dermatitis who presents to ED due to 3 weeks of worsening RLE pain. Vascular surgery was consulted for further evaluation.   Patient was evaluated at bedside, currently afebrile, nontachycardic, HDS. She states that she has had worsening RLE that starts from her distal foot and radiates up to her knee. She describes the pain as \"pressure like\" with some tingling. Prior to these three weeks, she was able to ambulate via a rollator, but subsequent to the onset of pain, she has relied more on wheelchair. She states that her lesly is constant in nature, and worsens when she ambulates. She states that she has had ongoing RLE pain for the past 6 months. Patient presented to [facility] ED yesterday for similar pain, and she was discharged on doxycycline, however has " "not yet taken a dose due to inability to get prescription.   Upon chart review, it appears that patient had wound on the right anterior shin back in January that was thought to be 2/2 long standing volume overload, and was treated as cellulitis. Area of blisteration has since healed, and is discolored.     PMH:   - HTN  - DM2  - schizophrenia  - HFrEF  - CAD  - afib s/p cardioversion 1/2025  - history of RLE stasis dermatitis     PSH:   - laparoscopic cholecystectomy    Home Meds:  Medications Ordered Prior to Encounter[1]     Allergies:  RX Allergies[2]    SH/FH:   - 0.5 packs/day for 25 years per chart review, but patient states that she quit 6 months ago     ROS: 12 system negative except HPI    Objective   Vitals:  Heart Rate:  [75-78]   Temperature:  [36.4 °C (97.5 °F)]   Respirations:  [18]   BP: (115-117)/(74-82)   Height:  [160 cm (5' 3\")]   Weight:  [95.3 kg (210 lb)]   Pulse Ox:  [95 %-96 %]     Exam:  Constitutional: No acute distress, resting comfortably  Neuro:  AOx3, grossly intact  ENMT: moist mucous membranes  Head/neck: atraumatic  CV: no tachycardia  Pulm: non-labored on room air  GI: soft, non-tender, non-distended  Skin: warm and dry  Musculoskeletal: moving all extremities  Extremities:   Palpable fems bilaterally   RLE with 2+ pitting edema and hyperpigmentation,   Multiphasic dopplerable PT, biphasic DP  LLE w mulitphasic PT, biphasic DP   Sensorimotor intact     Labs:  Results from last 7 days   Lab Units 06/24/25  1528   WBC AUTO x10*3/uL 11.3   HEMOGLOBIN g/dL 16.6*   PLATELETS AUTO x10*3/uL 241      Results from last 7 days   Lab Units 06/24/25  1528   SODIUM mmol/L 134*   POTASSIUM mmol/L 4.4   CHLORIDE mmol/L 102   CO2 mmol/L 24   BUN mg/dL 14   CREATININE mg/dL 0.50   GLUCOSE mg/dL 122*      Results from last 7 days   Lab Units 06/24/25  1528   INR  1.5*   PROTIME seconds 16.7*   APTT seconds 27     Imaging:  Reviewed independently by vascular team:  IMPRESSION:  VASCULAR:  1. Extensive " atherosclerotic changes are present in the artery  supplying the right lower extremity, including absent opacification  of the right anterior tibial artery in the mid and distal right calf,  and areas of likely focal high-grade stenosis present within the  distal right superficial femoral artery and right profunda femoris  artery.  2. Atherosclerotic plaques are present throughout the left lower  extremity, with areas of moderate and focal high-grade stenosis  present in the left superficial femoral and left popliteal arteries.  Faint enhancement is present in the distal anterior and posterior  tibial arteries in the lower calf, although no significant  enhancement is identified in the left peroneal artery.             [1]   No current facility-administered medications on file prior to encounter.     Current Outpatient Medications on File Prior to Encounter   Medication Sig Dispense Refill    apixaban (Eliquis) 5 mg tablet Take 1 tablet (5 mg) by mouth 2 times a day.      aspirin 81 mg EC tablet Take 1 tablet (81 mg) by mouth once daily. 30 tablet 2    atorvastatin (Lipitor) 80 mg tablet Take 0.5 tablets (40 mg) by mouth once daily.      dapagliflozin propanediol (Farxiga) 10 mg Take 1 tablet (10 mg) by mouth once daily.      digoxin (Lanoxin) 125 MCG tablet Take 1 tablet (125 mcg) by mouth once daily.      dulaglutide (Trulicity) 1.5 mg/0.5 mL pen injector injection Inject 1.5 mg under the skin every 7 days. Thursday      erythromycin (Romycin) 5 mg/gram (0.5 %) ophthalmic ointment Apply to both eyes once daily at bedtime. Apply Amount per Dose: 0.5 inch (~1 cm) per dose. 3.5 g 1    guaiFENesin (Mucinex) 600 mg 12 hr tablet Take 1 tablet (600 mg) by mouth 2 times a day as needed for cough. Do not crush, chew, or split. 30 tablet 0    ipratropium-albuteroL (Duo-Neb) 0.5-2.5 mg/3 mL nebulizer solution Take 3 mL by nebulization every 4 hours if needed (Cough).      lubricating eye drops ophthalmic solution Administer  1 drop into both eyes 4 times a day. 30 each 1    metoprolol succinate XL (Toprol-XL) 50 mg 24 hr tablet Take 1 tablet (50 mg) by mouth once daily.      sacubitriL-valsartan (Entresto) 49-51 mg tablet Take 1 tablet by mouth twice a day.      torsemide (Demadex) 20 mg tablet Take 1 tablet (20 mg) by mouth 2 times a day. If <200lbs, switch to once daily     [2]   Allergies  Allergen Reactions    Penicillin Swelling

## 2025-06-25 NOTE — CARE PLAN
The patient's goals for the shift include pain management    The clinical goals for the shift include Patient will have no c/o of pain or rate pain ,4 this shift    Over the shift, the patient did not have c/o of pain  Heparin drip continues as ordered

## 2025-06-25 NOTE — PROGRESS NOTES
06/25/25 1129   Discharge Planning   Living Arrangements Children   Support Systems Children   Assistance Needed [place]-Moderate   Type of Residence Other (Comment)  (Apartment)   Number of Stairs to Enter Residence   (Elevator)   Do you have animals or pets at home? No   Who is requesting discharge planning? Provider   Home or Post Acute Services In home services  (Pt is [facility])   Type of Home Care Services Home health aide;Home nursing visits;Home PT;Home OT   Expected Discharge Disposition Home Health   Does the patient need discharge transport arranged? No   RoundTrip coordination needed? No   Has discharge transport been arranged? No   Financial Resource Strain   How hard is it for you to pay for the very basics like food, housing, medical care, and heating? Not very   Housing Stability   In the last 12 months, was there a time when you were not able to pay the mortgage or rent on time? N   At any time in the past 12 months, were you homeless or living in a shelter (including now)? N   Transportation Needs   In the past 12 months, has lack of transportation kept you from medical appointments or from getting medications? no   In the past 12 months, has lack of transportation kept you from meetings, work, or from getting things needed for daily living? No     Met with pt introduced myself as  and member of the Care Transitions team for discharge planning.  Pt feels safe at home. Pt stated was independent prior to admission.  Pt drives to [employer].  Pt's address, phone number and contact information was verified. Pt does not have any other questions/concerns at this time.      Pt lives with son Ambrose Li [phone] @ [address] [place] [address]     Previous [facility]:  [facility]  DME:  wheel chair, rollator, and bsc  PCP: [facility]  Pharmacy:  [facility]  MEDICATIONS AFFORDABLE:  Yes  DIABETIC/SUPPLIES NEEDED:  Yes (no supplies)    Oxygen/Bipap/Cpap: No  Dialysis: No  Falls:  0  Transport home:  [facility] or Medical Transport   Potential Barriers: none   Discharge Disposition: home   ADOD:   3 days     Social Work Note    Pt is primarily independent with daily needs but requires some assistance. Pt is expected to discharge home with and resume services with [facility]. Pt utilizes family or medical transportation. Care transitions available to assist with any future discharge needs.       ALEX Wong

## 2025-06-25 NOTE — PROGRESS NOTES
Occupational Therapy    Evaluation    Patient Name: Eloy Li  MRN: [id number]  Department: [facility] [id number] VASCULAR  Room: 2029/2029-A  Today's Date: 6/25/2025  Time Calculation  Start Time: 0941  Stop Time: 1002  Time Calculation (min): 21 min        Assessment:  OT Assessment: Pt will benefit from continued skilled OT to increase independence in ADLs, functional mobility, activity tolerance, safety, and strength.  Prognosis: Excellent  Barriers to Discharge Home: No anticipated barriers  Evaluation/Treatment Tolerance: Patient tolerated treatment well  Medical Staff Made Aware: Yes  End of Session Communication: Bedside nurse  End of Session Patient Position: Bed, 3 rail up, Alarm on  OT Assessment Results: Decreased ADL status, Decreased upper extremity strength, Decreased safe judgment during ADL, Decreased endurance, Decreased functional mobility, Decreased IADLs  Prognosis: Excellent  Evaluation/Treatment Tolerance: Patient tolerated treatment well  Medical Staff Made Aware: Yes  Strengths: Attitude of self  Barriers to Participation: Comorbidities  Plan:  Treatment Interventions: ADL retraining, Functional transfer training, UE strengthening/ROM, Endurance training, Patient/family training, Equipment evaluation/education, Compensatory technique education  OT Frequency: 2 times per week  OT Discharge Recommendations: Low intensity level of continued care  Equipment Recommended upon Discharge:  (owns)  OT Recommended Transfer Status: Assist of 1  OT - OK to Discharge: Yes (upon medical clearance)  Treatment Interventions: ADL retraining, Functional transfer training, UE strengthening/ROM, Endurance training, Patient/family training, Equipment evaluation/education, Compensatory technique education    Subjective   Current Problem:  1. Venous stasis dermatitis of right lower extremity  Vascular US ankle brachial index (ENEIDA) without exercise    Vascular US ankle brachial index (ENEIDA) without exercise      2.  Cellulitis of right lower extremity          OT Visit Info:  OT Received On: 06/25/25  General:  General  Reason for Referral: swelling, pain, and numbness in her right foot that radiates up her right leg,  Past Medical History Relevant to Rehab: HFrEF (last EF 30-35% 8/24/24), gallstone pancreatitis, HLD, Afib, T2DM, HTN, and schizophrenia  Family/Caregiver Present: No  Prior to Session Communication: Bedside nurse  Patient Position Received: Bed, 3 rail up, Alarm off, not on at start of session  General Comment: Pt supine in bed upon arrival, agreeable to OT  Precautions:  Medical Precautions: Fall precautions    Pain:  Pain Assessment  Pain Assessment: 0-10  0-10 (Numeric) Pain Score: 10 - Worst possible pain  Pain Type: Acute pain  Pain Location: Foot  Pain Orientation: Right  Pain Interventions: Repositioned, Distraction    Objective   Cognition:  Overall Cognitive Status: Within Functional Limits  Orientation Level: Oriented X4  Safety/Judgement: Within Functional Limits  Insight: Within function limits  Impulsive: Within functional limits  Processing Speed: Within funtional limits     Home Living:  Type of Home: Apartment  Lives With:  (son)  Home Adaptive Equipment: Wheelchair-manual (rollator)  Home Layout: One level  Home Access: Elevator  Bathroom Shower/Tub: Tub/shower unit  Bathroom Equipment:  (shower bench)  Home Living Comments: has HHA 4 days/wk  Prior Function:  ADL Assistance: Needs assistance (HHA A with bathing and LE dressing)  Homemaking Assistance: Needs assistance (HHA completes)  Ambulatory Assistance: Independent  IADL History:  Homemaking Responsibilities: No  Current License: No  ADL:  Eating Assistance: Independent  Grooming Assistance: Stand by  Bathing Assistance: Moderate  UE Dressing Assistance: Stand by (gown management seated EOB SBA)  LE Dressing Assistance: Minimal (donned/doffed pants seated EOB min A, sit <> stand to ekta/doff over hips SBA, donned brief min A)  Toileting  Assistance with Device: Moderate  Activity Tolerance:  Endurance: Tolerates 10 - 20 min exercise with multiple rests  Bed Mobility/Transfers: Bed Mobility  Bed Mobility: Yes  Bed Mobility 1  Bed Mobility 1: Supine to sitting, Sitting to supine  Level of Assistance 1: Close supervision  Bed Mobility Comments 1: HOB elevated    Transfers  Transfer: Yes  Transfer 1  Transfer From 1: Sit to, Stand to  Transfer to 1: Stand, Sit  Technique 1: Sit to stand, Stand to sit  Transfer Device 1: Cane  Transfer Level of Assistance 1: Minimum assistance, Minimal verbal cues  Trials/Comments 1: 3x trials, VCs for hand placement    Functional Mobility:  Functional Mobility  Functional Mobility Performed: Yes  Functional Mobility 1  Surface 1: Level tile  Device 1: Rolling walker  Assistance 1: Contact guard  Comments 1: min household distance in room CGA FWW  Sitting Balance:  Static Sitting Balance  Static Sitting-Balance Support: Feet supported  Static Sitting-Level of Assistance: Independent  Dynamic Sitting Balance  Dynamic Sitting-Balance Support: Feet supported  Dynamic Sitting-Level of Assistance: Independent  Standing Balance:  Static Standing Balance  Static Standing-Balance Support: Bilateral upper extremity supported  Static Standing-Level of Assistance: Contact guard  Dynamic Standing Balance  Dynamic Standing-Balance Support: Bilateral upper extremity supported  Dynamic Standing-Level of Assistance: Contact guard   Modalities:  Modalities Used: No    Sensation:  Sensation Comment: reports N/T RLE  Strength:  Strength Comments: BUE WFL  Perception:  Inattention/Neglect: Appears intact  Coordination:  Movements are Fluid and Coordinated: Yes   Hand Function:  Gross Grasp: Functional  Coordination: Functional  Extremities: RUE   RUE : Within Functional Limits and LUE   LUE: Within Functional Limits    Outcome Measures:[facility] Daily Activity  Putting on and taking off regular lower body clothing: A little  Bathing (including  washing, rinsing, drying): A lot  Putting on and taking off regular upper body clothing: A little  Toileting, which includes using toilet, bedpan or urinal: A lot  Taking care of personal grooming such as brushing teeth: A little  Eating Meals: None  Daily Activity - Total Score: 17   and OT Adult Other Outcome Measures  4AT: negative    Education Documentation  Body Mechanics, taught by Anthony Miller OT at 6/25/2025  2:56 PM.  Learner: Patient  Readiness: Acceptance  Method: Explanation  Response: Verbalizes Understanding  Comment: OT POC, d/c rec, safety, ADLs    Precautions, taught by Anthony Miller OT at 6/25/2025  2:56 PM.  Learner: Patient  Readiness: Acceptance  Method: Explanation  Response: Verbalizes Understanding  Comment: OT POC, d/c rec, safety, ADLs    ADL Training, taught by Anthony Miller OT at 6/25/2025  2:56 PM.  Learner: Patient  Readiness: Acceptance  Method: Explanation  Response: Verbalizes Understanding  Comment: OT POC, d/c rec, safety, ADLs    Education Comments  No comments found.      Goals:  Encounter Problems       Encounter Problems (Active)       ADLs       Patient with complete lower body dressing with modified independent level of assistance donning and doffing all LE clothes  with PRN adaptive equipment while supported sitting and standing (Progressing)       Start:  06/25/25    Expected End:  07/09/25            Patient will complete toileting including hygiene clothing management/hygiene with modified independent level of assistance and grab bars. (Progressing)       Start:  06/25/25    Expected End:  07/09/25               BALANCE       Pt will maintain dynamic standing balance during ADL task with modified independent level of assistance in order to demonstrate decreased risk of falling and improved postural control. (Progressing)       Start:  06/25/25    Expected End:  07/09/25               EXERCISE/STRENGTHENING       Patient will complete BUE exercises in order to improve  strength and activity for ADL performance.  (Progressing)       Start:  06/25/25    Expected End:  07/09/25               MOBILITY       Patient will perform Functional mobility max Household distances/Community Distances with modified independent level of assistance and least restrictive device in order to improve safety and functional mobility. (Progressing)       Start:  06/25/25    Expected End:  07/09/25               TRANSFERS       Patient will complete sit to stand transfer with modified independent level of assistance and least restrictive device in order to improve safety and prepare for out of bed mobility. (Progressing)       Start:  06/25/25    Expected End:  07/09/25                 AYDIN Lawrence/L

## 2025-06-25 NOTE — PROGRESS NOTES
Physical Therapy    Physical Therapy Evaluation    Patient Name: Eloy Li  MRN: [id number]  Department: [facility] [place] [id number] VASCULAR  Room: 2029/2029-A  Today's Date: 6/25/2025   Time Calculation  Start Time: 0941  Stop Time: 1002  Time Calculation (min): 21 min    Assessment/Plan   PT Assessment  PT Assessment Results: Decreased strength, Decreased range of motion, Decreased endurance, Impaired balance, Decreased mobility, Pain  Rehab Prognosis: Good  Barriers to Discharge Home: Caregiver assistance, Physical needs  Caregiver Assistance: Caregiver assistance needed per identified barriers - however, level of patient's required assistance exceeds assistance available at home  Physical Needs: Ambulating household distances limited by function/safety  Evaluation/Treatment Tolerance: Patient limited by pain  Medical Staff Made Aware: Yes  End of Session Communication: Bedside nurse  Assessment Comment: 70yo female presents with inc pain, dec strength, balance, endurance limiting functional mobility.  Pt would benefit from continued therapy to address these deficits and improve safety and indep  End of Session Patient Position: Bed, 3 rail up, Alarm on  IP OR SWING BED PT PLAN  Inpatient or Swing Bed: Inpatient  PT Plan  Treatment/Interventions: Bed mobility, Transfer training, Gait training, Balance training, Strengthening, Endurance training, Range of motion, Therapeutic exercise, Therapeutic activity  PT Plan: Ongoing PT  PT Frequency: 3 times per week  PT Discharge Recommendations: Low intensity level of continued care  Equipment Recommended upon Discharge:  (tbd)  PT Recommended Transfer Status: Assist x1, Assistive device  PT - OK to Discharge: Yes (PT eval completed & recs made)    Subjective     PT Visit Info:  PT Received On: 06/25/25  General Visit Information:  General  Reason for Referral: swelling, pain, and numbness in her right foot that radiates up her right leg  Past Medical History Relevant to Rehab:  HFrEF (last EF 30-35% 8/24/24), gallstone pancreatitis, HLD, Afib, T2DM, HTN, and schizophrenia  Family/Caregiver Present: No  Co-Treatment: OT  Co-Treatment Reason: Pt seen with OT 2/2 dec activity tolerance 2/2 10/10 pain & team requesting dc recs  Prior to Session Communication: Bedside nurse  Patient Position Received: Bed, 3 rail up, Alarm off, not on at start of session  Preferred Learning Style: auditory, verbal, visual  General Comment: Pt supine in bed upon arrival, agreeable to participate in therapy assessment with encouragement  Home Living:  Home Living  Type of Home: Apartment  Lives With: Adult children (son)  Home Adaptive Equipment: Wheelchair-manual (rollator)  Home Layout: One level  Home Access: Elevator  Bathroom Shower/Tub: Tub/shower unit  Bathroom Equipment: Tub transfer bench  Home Living Comments: [facility] 4day/wk, 3-4hrs/day  Prior Level of Function:  Prior Function Per Pt/Caregiver Report  Level of Sampson: Needs assistance with ADLs  Receives Help From: Home health  ADL Assistance: Needs assistance  Ambulatory Assistance: Independent (with rollator in apt; wheelchair for outside in community)  Precautions:  Precautions  Medical Precautions: Fall precautions     Objective   Pain:  Pain Assessment  Pain Assessment: 0-10  0-10 (Numeric) Pain Score: 10 - Worst possible pain  Pain Type: Acute pain  Pain Location: Foot  Pain Orientation: Right  Pain Interventions: Repositioned, Elevated  Cognition:  Cognition  Overall Cognitive Status: Within Functional Limits  Orientation Level: Oriented X4  Insight: Mild  Impulsive: Mildly  Processing Speed: Within funtional limits    General Assessments:     Activity Tolerance  Endurance: Decreased tolerance for upright activites    Sensation  Sensation Comment: RLE with numbness/tingling, greatest in foot    Strength  Strength Comments: RLE pain limited; LLE grossly 4/5  Strength  Strength Comments: RLE pain limited; LLE grossly  4/5    Perception  Inattention/Neglect: Appears intact  Initiation: Appears intact  Motor Planning: Appears intact  Perseveration: Not present    Coordination  Movements are Fluid and Coordinated: Yes    Postural Control  Postural Control: Within Functional Limits    Static Sitting Balance  Static Sitting-Balance Support: Feet supported  Static Sitting-Level of Assistance: Close supervision    Static Standing Balance  Static Standing-Balance Support: Bilateral upper extremity supported  Static Standing-Level of Assistance: Contact guard  Dynamic Standing Balance  Dynamic Standing-Balance Support: Bilateral upper extremity supported  Dynamic Standing-Level of Assistance: Contact guard  Functional Assessments:  Bed Mobility  Bed Mobility: Yes  Bed Mobility 1  Bed Mobility 1: Supine to sitting, Sitting to supine  Level of Assistance 1: Close supervision  Bed Mobility Comments 1: HOB elevated, use of rails    Transfers  Transfer: Yes  Transfer 1  Technique 1: Sit to stand, Stand to sit  Transfer Device 1: Walker  Transfer Level of Assistance 1: Minimum assistance, Minimal verbal cues  Trials/Comments 1: x3 trials in session for hygiene & linen changes; cueing for safety and hand placement with fair carryover    Ambulation/Gait Training  Ambulation/Gait Training Performed: Yes  Ambulation/Gait Training 1  Surface 1: Level tile  Device 1: Rolling walker  Assistance 1: Contact guard  Quality of Gait 1: Decreased step length, Antalgic  Comments/Distance (ft) 1: 6'; limited by pain    Stairs  Stairs: No  Extremity/Trunk Assessments:  RUE   RUE : Within Functional Limits  LUE   LUE: Within Functional Limits  RLE   RLE :  (pain limited; dec PF/DF)  LLE   LLE :  (grossly 4/5)  Outcome Measures:  [facility] Basic Mobility  Turning from your back to your side while in a flat bed without using bedrails: A little  Moving from lying on your back to sitting on the side of a flat bed without using bedrails: A little  Moving to and from bed  to chair (including a wheelchair): A little  Standing up from a chair using your arms (e.g. wheelchair or bedside chair): A little  To walk in hospital room: A little  Climbing 3-5 steps with railing: Total  Basic Mobility - Total Score: 16    Encounter Problems       Encounter Problems (Active)       Balance       Pt will score >24 on Tinetti assessment to indicate low falls risk        Start:  06/25/25    Expected End:  07/09/25               Mobility       STG - Patient will ambulate >100' with WW and SUP       Start:  06/25/25    Expected End:  07/09/25               PT Transfers       STG - Patient to transfer to and from sit to supine indep from flat bed, no rails        Start:  06/25/25    Expected End:  07/09/25            STG - Patient will transfer sit to and from stand with SBA and WW       Start:  06/25/25    Expected End:  07/09/25               Pain - Adult              Education Documentation  Precautions, taught by Jennifer Du PT at 6/25/2025  3:16 PM.  Learner: Patient  Readiness: Acceptance  Method: Explanation  Response: Verbalizes Understanding  Comment: falls prec, use of AD, PT poc    Mobility Training, taught by Jennifer Du PT at 6/25/2025  3:16 PM.  Learner: Patient  Readiness: Acceptance  Method: Explanation  Response: Verbalizes Understanding  Comment: falls prec, use of AD, PT poc    Education Comments  No comments found.        06/25/25 at 3:17 PM - Jennifer Du PT

## 2025-06-25 NOTE — H&P
History Of Present Illness  Eloy Li is a 69 y.o. female with a PMH of HFrEF (last EF 30-35% 8/24/24), gallstone pancreatitis, HLD, Afib, T2DM, HTN, and schizophrenia. Pt presented to ED for further evaluation of swelling, pain, and numbness in her right foot that radiates up her right leg. She rates pain as 10/10 in right foot/leg. Pain is described as constant shooting pain that is worse with ambulation and weight bearing. She has also experienced difficulty with ambulation due to leg pain. She ambulates with rollator at baseline but since pain started 3 wks ago she has been wheelchair dependent. No meds taken to help with pain prior to ED presentation. Pt reports that she was seen at [facility] on 6/20 and diagnosed with cellulitis of RLE. She was given prescription for Doxycycline 100 mg PO BID x10 days but she did not  prescription from pharmacy because she was unsure if cost of medication would be covered by her insurance. Pt is noncompliant with outpatient medications. She also endorses SOB that is always present at baseline per her report. Labs reviewed and notable for elevated BNP, elevated CRP, and mild hyponatremia. Duplex US of RLE negative for DVT. CT angio with runoff showed extensive atherosclerotic changes in the artery supplying the right lower extremity including absent opacification of the right anterior tibial artery in the mid and distal right calf and stenosis within the distal right superficial femoral artery and right profunda femoris artery. Vascular surgery consulted. Per vascular surgery no acute surgical intervention. Recommendation made to stop Eliquis and begin low intensity heparin gtt. No acute surgical intervention and no concern for ischemic limb. Symptoms likely secondary to statis dermatitis per vascular surgery. Doxycycline started while in ED by provider d/t suspected early cellulitis. Per chart review pt receives most of her care at [facility]. Decision made to admit pt  to medicine service for further treatment of venous stasis dermatitis of RLE and PT/OT consult.    ED interventions: Heparin gtt @ 10 ml/hr, Heparin 4000 unit bolus x1, Doxycycline 100 mg PO x1 dose     Past Medical History  Medical History[1]    Surgical History  Surgical History[2]     Social History  She reports that she has never smoked. She has never used smokeless tobacco. No history on file for alcohol use and drug use.    Family History  Family History[3]     Allergies  Penicillin    Review of Systems   Constitutional:  Negative for chills and fever.   HENT:  Negative for congestion.    Respiratory:  Positive for cough and shortness of breath.    Cardiovascular:  Positive for leg swelling. Negative for chest pain.   Gastrointestinal:  Negative for abdominal pain, diarrhea, nausea and vomiting.   Genitourinary:  Negative for difficulty urinating and dysuria.   Musculoskeletal:  Positive for gait problem.   Skin:  Positive for color change.   Neurological:  Positive for weakness. Negative for dizziness, light-headedness and headaches.   Psychiatric/Behavioral:  Negative for confusion.         Physical Exam  Vitals reviewed.   Constitutional:       General: She is not in acute distress.  HENT:      Head: Normocephalic.      Mouth/Throat:      Mouth: Mucous membranes are dry.      Comments: Missing multiple teeth  Cardiovascular:      Rate and Rhythm: Normal rate.      Pulses: Normal pulses.   Pulmonary:      Effort: Pulmonary effort is normal. No respiratory distress.      Breath sounds: Normal breath sounds.   Abdominal:      General: Bowel sounds are normal.      Palpations: Abdomen is soft.   Musculoskeletal:      Cervical back: Normal range of motion.      Comments: +2 nonpitting BLE edema, +2 PT/DP pulses via doppler   Skin:     General: Skin is warm and dry.      Comments: Venous stasis changes RLE   Neurological:      Mental Status: She is alert and oriented to person, place, and time.          Last  "Recorded Vitals  Blood pressure (!) 149/104, pulse 80, temperature 36.4 °C (97.5 °F), temperature source Temporal, resp. rate 20, height 1.6 m (5' 3\"), weight 95.3 kg (210 lb), SpO2 97%.    Relevant Results    CT angio aorta and bilateral iliofemoral runoff including without contrast if performed  Result Date: 6/24/2025  Interpreted By:  Fara Beach, STUDY: CT ANGIO AORTA AND BILATERAL ILIOFEMORAL RUN OFF INCLUDING WITHOUT CONTRAST IF PERFORMED;  6/24/2025 7:07 pm   INDICATION: Signs/Symptoms:RLE pain numbness, discoloration to bilateral toes.     COMPARISON: CT abdomen and pelvis dated 10/27/2022.   ACCESSION NUMBER(S): [id number]   ORDERING CLINICIAN: TERELL MAIER   TECHNIQUE: Thin-section axial images of the abdomen, pelvis and bilateral lower extremities were obtained in the arterial phase after intravenous administration of 110 mL of Omnipaque 350 iodinated contrast. Delayed images the legs were obtained for assessment of venous patency. Coronal and sagittal reformatted images were reconstructed from the axial data. Multiplanar MIPs and 3D reconstructions were created on an independent workstation and reviewed.   FINDINGS: VASCULATURE:   ABDOMINAL AORTA: No abdominal aortic aneurysm or dissection. Minimal aortic atherosclerosis.   ABDOMINAL ARTERIES: No significant atherosclerosis or stenosis is identified within the large arteries in the abdomen. Celiac, superior mesenteric, renal and inferior mesenteric arteries are widely patent.     RIGHT LOWER EXTREMITY:   - Right Common Iliac Artery:  No hemodynamically significant stenosis. - Right External Iliac Artery:  No hemodynamically significant stenosis. - Right Internal Iliac Artery:  Semi circumferential calcified and noncalcified atherosclerotic plaque contributes to mild-to-moderate stenosis, with preserved opacification of the vessels distally.   - Right Common Femoral Artery:  Mild stenosis is present due to semi circumferential, predominantly " noncalcified atherosclerotic plaque. - Right Profunda Artery:  Multifocal areas of moderate to severe stenosis due to calcified and noncalcified atherosclerotic plaque are present in the proximal right profunda femoris artery, with preserved opacification of the its more distal branches. - Right Superficial Femoral Artery:  Multifocal areas of mild-to-moderate stenosis due to calcified plaque are present throughout the proximal and mid right superficial femoral artery, with area of focal high-grade stenosis present within the vessel distally (series 401, image [id number]), with preserved enhancement of the more distal segment. - Right Popliteal Artery:  Mild stenosis is present in the right popliteal artery due to calcified and noncalcified atherosclerotic plaque. - Right Anterior Tibial, Posterior Tibial, Peroneal Arteries: Only the proximal most segment of the anterior tibial artery demonstrates opacification, with no definite enhancement identified in the distal anterior tibial artery in the lower leg. Multifocal areas of moderate to high-grade stenosis are present within the tibioperoneal trunk, with the enhancement of the posterior tibial and peroneal arteries to the ankle, although there are likely areas of mild-to-moderate stenosis present in the both vessels due to calcified and noncalcified atherosclerotic plaques..       LEFT LOWER EXTREMITY:   - Left Common Iliac Artery:  Mild stenosis is present in the left common iliac artery due to semi circumferential plaque. - Left External Iliac Artery:  No hemodynamically significant stenosis. - Left Internal Iliac Artery:  Focal moderate and high-grade stenosis may be present in the mid and distal left internal iliac artery, with some enhancement of the more distal branches.   - Left Common Femoral Artery:  Mild stenosis is present in the distal left common femoral artery due to atherosclerotic plaque. - Left Profunda Artery:  Moderate stenosis is present in the proximal  left profunda femoris artery due to calcified plaque. - Left Superficial Femoral Artery:  Multifocal areas of mild to moderate stenosis are present in the proximal and distal left superficial femoral artery, with preserved enhancement of the vessels more distally. - Left Popliteal Artery:  Mild-to-moderate stenosis is present in the left popliteal artery due to calcified atherosclerotic plaque. There is area of suspected high-grade stenosis in the vessel distally. - Left Anterior Tibial, Posterior Tibial, Peroneal Arteries:  Examination of the three-vessel runoff in the left calf is significantly limited due to extensive vessel calcifications. There may be some faint enhancement present in the distal posterior tibial artery and anterior tibial artery, with no appreciable enhancement identified in the distal left peroneal artery.         ABDOMEN/PELVIS:   LIVER: No acute hepatic parenchymal abnormality is identified, although the liver is poorly opacify due to contrast bolus timing.   BILE DUCTS: No intrahepatic or extrahepatic biliary dilatation is present.   GALLBLADDER: Gallbladder is surgically absent.   PANCREAS: No pancreatic ductal dilatation or peripancreatic stranding is present.   SPLEEN: Several round hypodense lesions are again visualized within the spleen, similar to prior exam in October of 2022, possibly representing epithelial cysts, but incompletely characterized on current study.   No new abnormalities are identified in the spleen.   ADRENALS: Adrenal glands are similar in appearance to prior exam without evidence of acute abnormalities.   KIDNEYS, URETERS, BLADDER: Kidneys are symmetric in size and enhancement without evidence of hydronephrosis or radiopaque nephrolithiasis. Several small hypodensities in the cortex bilaterally likely represent cysts.   Upper ureters are unremarkable in appearance.   Mild bladder wall thickening may be due to incomplete distention.   REPRODUCTIVE ORGANS: Uterus is  present. No adnexal masses or fluid collections are identified.   VESSELS: See above. No additional significant abnormality.   RETROPERITONEUM/LYMPH NODES: No acute retroperitoneal abnormality. No enlarged lymph nodes.   BOWEL/PERITONEUM: Stomach is unremarkable in appearance. No inflammatory bowel wall thickening or dilatation. Appendix is not definitely identified, although no secondary signs of acute appendicitis are present in the its expected location in the right lower quadrant.   Small amount of free fluid/ascites is present in the abdomen. No free air or thick-walled collections are identified.     MUSCULOSKELETAL/ABDOMINAL WALL: No acute osseous abnormality or suspicious lesion. There is diffuse edema/fluid present in the cutaneous tissues of the visualized lower chest, abdomen, pelvis and lower extremities suggestive of 3rd spacing.   LOWER CHEST: Included lung bases are clear without evidence of consolidation or sizable effusion.   There is mild 4 chamber cardiomegaly without sizable pericardial effusion.   Distal esophagus is unremarkable in appearance.       VASCULAR: 1. Extensive atherosclerotic changes are present in the artery supplying the right lower extremity, including absent opacification of the right anterior tibial artery in the mid and distal right calf, and areas of likely focal high-grade stenosis present within the distal right superficial femoral artery and right profunda femoris artery. 2. Atherosclerotic plaques are present throughout the left lower extremity, with areas of moderate and focal high-grade stenosis present in the left superficial femoral and left popliteal arteries. Faint enhancement is present in the distal anterior and posterior tibial arteries in the lower calf, although no significant enhancement is identified in the left peroneal artery.   NONVASCULAR: 1. Mild 4 chamber cardiomegaly, with diffuse edema/fluid present in the cutaneous tissues of the visualized lower  chest, abdomen, pelvis and lower extremity and small amount of free fluid in the abdomen/pelvis, suggestive of 3rd spacing. Correlate with fluid volume status. 2. Mild bladder wall thickening may be due to incomplete distention, although correlation with urinalysis to exclude any underlying cystitis is recommended. 3. Several rounded hypodensities in the spleen are similar in appearance to prior exam in October of 2022, likely representing benign process, possibly epithelial cysts given the stability to prior study, although incompletely characterized on current exam.   MACRO: None.   Signed by: Fara Beach 6/24/2025 7:38 PM Dictation workstation:   [id number]    XR foot right 3+ views  Result Date: 6/24/2025  STUDY: Right foot and ankle radiographs; 06/24/2025; 05:57PM. INDICATION: Pain. COMPARISON: None Available. ACCESSION NUMBER(S): [id number], [id number] ORDERING CLINICIAN: TECHNIQUE:  Three views of the right foot and four views of the right ankle. FINDINGS:  Right Foot:  There is no displaced fracture.  The alignment is anatomic.  No soft tissue abnormality is seen. Mild degenerative changes. Small heel spur. Right Ankle:  There is no displaced fracture.  The alignment is anatomic.  No soft tissue abnormality is seen. Mild degenerative changes.    Mild degenerative changes. No acute osseous abnormality. Small heel spur. Signed by Steve Johnson MD    XR ankle right 3+ views  Result Date: 6/24/2025  STUDY: Right foot and ankle radiographs; 06/24/2025; 05:57PM. INDICATION: Pain. COMPARISON: None Available. ACCESSION NUMBER(S): [id number], [id number] ORDERING CLINICIAN: TECHNIQUE:  Three views of the right foot and four views of the right ankle. FINDINGS:  Right Foot:  There is no displaced fracture.  The alignment is anatomic.  No soft tissue abnormality is seen. Mild degenerative changes. Small heel spur. Right Ankle:  There is no displaced fracture.  The alignment is anatomic.  No soft  tissue abnormality is seen. Mild degenerative changes.    Mild degenerative changes. No acute osseous abnormality. Small heel spur. Signed by Steve Johnson MD    XR chest 2 views  Result Date: 6/24/2025  STUDY: Chest Radiographs;  6/24/2025 at 5:57 PM. INDICATION: Cough. COMPARISON: XR chest 8/23/2024, 11/1/2022. ACCESSION NUMBER(S): [id number] ORDERING CLINICIAN: TERELL MAIER TECHNIQUE:  Frontal and lateral chest. FINDINGS: CARDIOMEDIASTINAL SILHOUETTE: Heart is enlarged with pulmonary vascular congestion.  LUNGS: Lungs are clear.  ABDOMEN: No remarkable upper abdominal findings.  BONES: No acute osseous changes.    Cardiomegaly and pulmonary vascular congestion. Signed by Steve Johnson MD    Lower extremity venous duplex right  Result Date: 6/24/2025  Interpreted By:  Marcial Gutierrez and Benza Andrew STUDY: [facility] US LOWER EXTREMITY VENOUS DUPLEX RIGHT;  6/24/2025 4:57 pm   INDICATION: Signs/Symptoms:RLE pain, swelling.     COMPARISON: None.   ACCESSION NUMBER(S): [id number]   ORDERING CLINICIAN: TERELL MAIER   TECHNIQUE: Vascular ultrasound of the right lower extremity was performed. Real-time compression views as well as Gray scale, color Doppler and spectral Doppler waveform analysis was performed.   FINDINGS: Evaluation of the visualized portions of the right common femoral vein, proximal, mid, and distal femoral vein, and popliteal vein were performed.  Evaluation of the visualized portions of the  posterior tibial and peroneal veins were also performed.   Limitations:  Body habitus and lower extremity edema   The evaluated veins demonstrate normal compressibility. There is intact venous flow demonstrating normal respiratory variability and normal augmentation of flow with calf compression. Therefore, there is no ultrasonographic evidence for deep vein thrombosis within the evaluated veins.       No sonographic evidence for deep vein thrombosis within the evaluated veins of the right lower extremity.   I  personally reviewed the images/study and I agree with the findings as stated above by resident physician, Fer Valdovinos MD. This study was interpreted at [facility], [place], [place].   MACRO: None   Signed by: Marcial Gutierrez 6/24/2025 6:26 PM Dictation workstation:   [id number]    Results for orders placed or performed during the hospital encounter of 06/24/25 (from the past 24 hours)   CBC and Auto Differential   Result Value Ref Range    WBC 11.3 4.4 - 11.3 x10*3/uL    nRBC 0.0 0.0 - 0.0 /100 WBCs    RBC 5.87 (H) 4.00 - 5.20 x10*6/uL    Hemoglobin 16.6 (H) 12.0 - 16.0 g/dL    Hematocrit 51.3 (H) 36.0 - 46.0 %    MCV 87 80 - 100 fL    MCH 28.3 26.0 - 34.0 pg    MCHC 32.4 32.0 - 36.0 g/dL    RDW 16.1 (H) 11.5 - 14.5 %    Platelets 241 150 - 450 x10*3/uL    Neutrophils % 73.5 40.0 - 80.0 %    Immature Granulocytes %, Automated 1.1 (H) 0.0 - 0.9 %    Lymphocytes % 14.1 13.0 - 44.0 %    Monocytes % 10.8 2.0 - 10.0 %    Eosinophils % 0.1 0.0 - 6.0 %    Basophils % 0.4 0.0 - 2.0 %    Neutrophils Absolute 8.28 (H) 1.20 - 7.70 x10*3/uL    Immature Granulocytes Absolute, Automated 0.12 0.00 - 0.70 x10*3/uL    Lymphocytes Absolute 1.59 1.20 - 4.80 x10*3/uL    Monocytes Absolute 1.22 (H) 0.10 - 1.00 x10*3/uL    Eosinophils Absolute 0.01 0.00 - 0.70 x10*3/uL    Basophils Absolute 0.05 0.00 - 0.10 x10*3/uL   Comprehensive metabolic panel   Result Value Ref Range    Glucose 122 (H) 74 - 99 mg/dL    Sodium 134 (L) 136 - 145 mmol/L    Potassium 4.4 3.5 - 5.3 mmol/L    Chloride 102 98 - 107 mmol/L    Bicarbonate 24 21 - 32 mmol/L    Anion Gap 12 10 - 20 mmol/L    Urea Nitrogen 14 6 - 23 mg/dL    Creatinine 0.50 0.50 - 1.05 mg/dL    eGFR >90 >60 mL/min/1.73m*2    Calcium 8.6 8.6 - 10.6 mg/dL    Albumin 2.6 (L) 3.4 - 5.0 g/dL    Alkaline Phosphatase 148 (H) 33 - 136 U/L    Total Protein 6.9 6.4 - 8.2 g/dL    AST 19 9 - 39 U/L    Bilirubin, Total 1.5 (H) 0.0 - 1.2 mg/dL    ALT 11 7 - 45 U/L    Coagulation Screen   Result Value Ref Range    Protime 16.7 (H) 9.8 - 12.4 seconds    INR 1.5 (H) 0.9 - 1.1    aPTT 27 26 - 36 seconds   Sedimentation rate, automated   Result Value Ref Range    Sedimentation Rate 22 0 - 30 mm/h   C-reactive protein   Result Value Ref Range    C-Reactive Protein 8.38 (H) <1.00 mg/dL   B-type natriuretic peptide   Result Value Ref Range     (H) 0 - 99 pg/mL   Sars-CoV-2 and Influenza A/B PCR   Result Value Ref Range    Flu A Result Not Detected Not Detected    Flu B Result Not Detected Not Detected    Coronavirus 2019, PCR Not Detected Not Detected   Urinalysis with Reflex Culture and Microscopic   Result Value Ref Range    Color, Urine Yellow Light-Yellow, Yellow, Dark-Yellow    Appearance, Urine Clear Clear    Specific Gravity, Urine >1.050 (N) 1.005 - 1.035    pH, Urine 6.0 5.0, 5.5, 6.0, 6.5, 7.0, 7.5, 8.0    Protein, Urine 300 (3+) (A) NEGATIVE, 10 (TRACE), 20 (TRACE) mg/dL    Glucose, Urine 30 (TRACE) (A) Normal mg/dL    Blood, Urine 0.2 (2+) (A) NEGATIVE mg/dL    Ketones, Urine TRACE (A) NEGATIVE mg/dL    Bilirubin, Urine NEGATIVE NEGATIVE mg/dL    Urobilinogen, Urine 3 (1+) (A) Normal mg/dL    Nitrite, Urine NEGATIVE NEGATIVE    Leukocyte Esterase, Urine NEGATIVE NEGATIVE   Urinalysis Microscopic   Result Value Ref Range    WBC, Urine 11-20 (A) 1-5, NONE /HPF    RBC, Urine 11-20 (A) NONE, 1-2, 3-5 /HPF    Squamous Epithelial Cells, Urine 1-9 (SPARSE) Reference range not established. /HPF    Mucus, Urine FEW Reference range not established. /LPF   ECG 12 lead   Result Value Ref Range    Ventricular Rate 76 BPM    Atrial Rate 76 BPM    AR Interval 150 ms    QRS Duration 88 ms    QT Interval 456 ms    QTC Calculation(Bazett) 513 ms    P Axis 55 degrees    R Axis -14 degrees    T Axis 88 degrees    QRS Count 12 beats    Q Onset 215 ms    P Onset 140 ms    P Offset 162 ms    T Offset 443 ms    QTC Fredericia 493 ms         Lab Results   Component Value Date    HGBA1C 7.6  (H) 01/24/2025      Scheduled medications  Scheduled Medications[4]  Continuous medications  Continuous Medications[5]  PRN medications  PRN Medications[6]    Assessment and Plan  Eloy Li is a 69 year old female with a PMH of HFrEF (last EF 30-35% 8/24/24), gallstone pancreatitis, HLD, Afib, T2DM, HTN, and schizophrenia. Pt presented to ED for further evaluation of swelling, pain, and numbness in her right foot that radiates up her right leg. She rates pain as 10/10 in right foot/leg. She has also experienced difficulty with ambulation due to leg pain. Pt admitted to medicine service for further treatment of venous stasis dermatitis of RLE and PT/OT consult.    Venous stasis dermatitis of RLE  - vascular surgery consulted while in ED d/t RLE atherosclerotic changes and claudication (see consult note)  - c/w low intensity Heparin gtt initiated while in the ED  - PT/OT consulted  - vascular ENEIDA without exercise ordered for AM  - falls precautions  - Tylenol 650 mg q6h PRN mild-moderate pain and Oxycodone 5 mg q6h PRN severe pain    RLE cellulitis  - ESR 22 and CRP 8.38  - low suspicion for cellulitis,continue to monitor RLE for progression   - Doxycycline 100 mg PO BID x10 days (6/24-)    HFrEF (last EF 30-35% 8/24/24)  HTN  BLE edema  Elevated BNP  - last /104  -  on admit  - continue to monitor BP  - DW, I&O  - previously prescribed home regimen: Entresto 24/26 mg PO BID, Aldactone 12.5 mg PO every day, Bumex 1 mg PO weekly (Sunday), Farxiga 10 mg PO every day, and Toprol XL 50 mg PO every day  - resume home regimen with the exception of changing Bumex to 1 mg PO every day x3 doses d/t BLE edema    Afib  - holding home dose of Eliquis (pt noncompliant)  - s/p failed DCCV 2022 and successful cardioversion in 1/2025  - c/w low intensity Heparin gtt started while in Ed  - resume dose of Toprol XL 50 mg PO every day    T2DM (last HgA1c 7.6% 1/24/25)  - resume home dose of Farxiga 10 mg PO every  day  - accucheck ACHS  - Lispro SSI 0-10 units  - repeat HgA1c pending result    Dispo: admit to [facility]. Plan per above. Estimated LOS: 3 days          Sylvia Renee, BRUCE-CNP         [1] History reviewed. No pertinent past medical history.  [2] History reviewed. No pertinent surgical history.  [3] No family history on file.  [4] aspirin, 81 mg, oral, Daily  atorvastatin, 40 mg, oral, Nightly  bumetanide, 1 mg, oral, Daily  doxycylcine, 100 mg, oral, BID  insulin lispro, 0-10 Units, subcutaneous, TID AC  magnesium oxide, 400 mg of magnesium oxide, oral, Daily  metoprolol succinate XL, 50 mg, oral, Daily  polyethylene glycol, 17 g, oral, Daily  sacubitriL-valsartan, 1 tablet, oral, BID  sennosides, 1 tablet, oral, Nightly  spironolactone, 12.5 mg, oral, Daily  [5] heparin, 0-4,000 Units/hr, Last Rate: 1,000 Units/hr (06/24/25 9403)  [6] PRN medications: acetaminophen, dextrose, dextrose, glucagon, glucagon, heparin, melatonin, oxyCODONE     BiPAP/CPAP

## 2025-06-25 NOTE — PROGRESS NOTES
9:13 PM I received this patient in signout from previous KRISTINE.  Please see their note for full H&P.    In brief this is 69-year-old female presents ED for chief complaints of right lower extremity pain.  Patient noted to have chronic extensive atherosclerotic changes to right lower extremity with weak pedal pulses.  Patient was signed out to me pending likely admission to medicine service.  Vascular surgery also involved in the care of this patient with plans to follow.     Patient is now admitted to medicine service at this time.  Empirically will start doxycycline at this time for suspected early cellulitic component to his presentation.  Negative for ultrasound of the right lower extremity.  Vascular surgery stated no acute surgical intervention indicated from their perspective.  Patient will be ordered to stop Eliquis that they had been taking previously and will be started on low intensity heparin drip at this time.  No acute ischemic limb concern at this time.  Patient admitted to medicine.    Tate Lieberman PA-C  6/24/2025

## 2025-06-25 NOTE — PROGRESS NOTES
Medical Group Progress Note  ASSESSMENT & PLAN:   Eloy Li is a 69 year old female with a PMH of HFrEF (last EF 30-35% 8/24/24), gallstone pancreatitis, HLD, Afib, T2DM, HTN, and schizophrenia. Pt presented to ED for further evaluation of swelling, pain, and numbness in her right foot that radiates up her right leg. She rates pain as 10/10 in right foot/leg. She has also experienced difficulty with ambulation due to leg pain. Pt admitted to medicine service for further treatment of venous stasis dermatitis of RLE and PT/OT consult.     Venous stasis dermatitis of RLE  - vascular surgery consulted while in ED d/t RLE atherosclerotic changes and claudication (see consult note)  - c/w low intensity Heparin gtt initiated while in the ED  - PT/OT consulted  - vascular ENEIDA without exercise ordered for AM  - falls precautions  - Tylenol 650 mg q6h PRN mild-moderate pain and Oxycodone 5 mg q6h PRN severe pain     RLE cellulitis  - ESR 22 and CRP 8.38  - low suspicion for cellulitis,continue to monitor RLE for progression   - Doxycycline 100 mg PO BID x10 days (6/24-)     HFrEF (last EF 30-35% 8/24/24)  HTN  BLE edema  Elevated BNP  - last /104  -  on admit  - continue to monitor BP  - DW, I&O  - previously prescribed home regimen: Entresto 24/26 mg PO BID, Aldactone 12.5 mg PO every day, Bumex 1 mg PO weekly (Sunday), Farxiga 10 mg PO every day, and Toprol XL 50 mg PO every day  - resume home regimen with the exception of changing Bumex to 1 mg PO every day x3 doses d/t BLE edema     Afib  - holding home dose of Eliquis (pt noncompliant)  - s/p failed DCCV 2022 and successful cardioversion in 1/2025  - c/w low intensity Heparin gtt started while in Ed  - resume dose of Toprol XL 50 mg PO every day     T2DM (last HgA1c 7.6% 1/24/25)  - resume home dose of Farxiga 10 mg PO every day  - accucheck ACHS  - Lispro SSI 0-10 units  - repeat HgA1c pending result    Disposition: ADOD 1-2 days    Level of MDM:   Moderate   Risk: Moderate   Data Reviewed and/or Analyzed:   Included: Prior external notes from at least 1 unique source, Results, including laboratory findings and imaging reports, listed above, Orders and notes from all consultants involved, and Notes for this encounter.  I personally reviewed the tests referenced above.    The patient/family had opportunity to ask questions. All questions were answered to the best of my ability.    Woody Alvarez, [facility]    SUBJECTIVE     Patient states to continue feeling discomfort from right leg    OBJECTIVE:     Last Recorded Vitals:  Vitals:    06/25/25 0056 06/25/25 0600 06/25/25 0601 06/25/25 1300   BP: (!) 159/104  122/78 117/67   BP Location: Right arm      Patient Position: Lying   Lying   Pulse: 88  88 81   Resp: 19  19 18   Temp: 36.6 °C (97.9 °F)   37.1 °C (98.8 °F)   TempSrc: Temporal      SpO2: 96%  94% 100%   Weight:  98.2 kg (216 lb 7.9 oz)     Height:         Last I/O:  I/O last 3 completed shifts:  In: 52 (0.5 mL/kg) [I.V.:52 (0.5 mL/kg)]  Out: 0 (0 mL/kg)   Weight: 98.2 kg     Physical Exam    Inpatient Medications:  Scheduled Medications[1]PRN Medications  PRN Medications[2]  Continuous Medications:  Continuous Medications[3]  LABS AND IMAGING:     Labs:  Results from last 7 days   Lab Units 06/25/25  0724 06/24/25  1528   WBC AUTO x10*3/uL 14.7* 11.3   RBC AUTO x10*6/uL 4.84 5.87*   HEMOGLOBIN g/dL 13.5 16.6*   HEMATOCRIT % 43.5 51.3*   MCV fL 90 87   MCH pg 27.9 28.3   MCHC g/dL 31.0* 32.4   RDW % 16.3* 16.1*   PLATELETS AUTO x10*3/uL 203 241     Results from last 7 days   Lab Units 06/25/25  0724 06/24/25  1528   SODIUM mmol/L 133* 134*   POTASSIUM mmol/L 4.2 4.4   CHLORIDE mmol/L 99 102   CO2 mmol/L 26 24   BUN mg/dL 18 14   CREATININE mg/dL 0.58 0.50   GLUCOSE mg/dL 124* 122*   PROTEIN TOTAL g/dL  --  6.9   CALCIUM mg/dL 7.9* 8.6   BILIRUBIN TOTAL mg/dL  --  1.5*   ALK PHOS U/L  --  148*   AST U/L  --  19   ALT U/L  --  11     Results  from last 7 days   Lab Units 06/25/25  0724   MAGNESIUM mg/dL 1.75         Imaging:  ECG 12 lead  Normal sinus rhythm  Cannot rule out Anterior infarct (cited on or before 27-OCT-2022)  Abnormal ECG  When compared with ECG of 23-AUG-2024 13:08,  Premature atrial complexes are no longer Present  ST now depressed in Inferior leads  ST now depressed in Anterior leads    See ED provider note for full interpretation and clinical correlation  Confirmed by Palak Delacruz ([id number]) on 6/24/2025 11:23:30 PM  CT angio aorta and bilateral iliofemoral runoff including without contrast if performed  Narrative: Interpreted By:  [place],   STUDY:  CT ANGIO AORTA AND BILATERAL ILIOFEMORAL RUN OFF INCLUDING WITHOUT  CONTRAST IF PERFORMED;  6/24/2025 7:07 pm      INDICATION:  Signs/Symptoms:RLE pain numbness, discoloration to bilateral toes.          COMPARISON:  CT abdomen and pelvis dated 10/27/2022.      ACCESSION NUMBER(S):  [id number]      ORDERING CLINICIAN:  TERELL MAIER      TECHNIQUE:  Thin-section axial images of the abdomen, pelvis and bilateral lower  extremities were obtained in the arterial phase after intravenous  administration of 110 mL of Omnipaque 350 iodinated contrast. Delayed  images the legs were obtained for assessment of venous patency.  Coronal and sagittal reformatted images were reconstructed from the  axial data. Multiplanar MIPs and 3D reconstructions were created on  an independent workstation and reviewed.      FINDINGS:  VASCULATURE:      ABDOMINAL AORTA: No abdominal aortic aneurysm or dissection. Minimal  aortic atherosclerosis.      ABDOMINAL ARTERIES: No significant atherosclerosis or stenosis is  identified within the large arteries in the abdomen. Celiac, superior  mesenteric, renal and inferior mesenteric arteries are widely patent.          RIGHT LOWER EXTREMITY:      - Right Common Iliac Artery:  No hemodynamically significant stenosis.  - Right External Iliac Artery:  No  hemodynamically significant  stenosis. - Right Internal Iliac Artery:  Semi circumferential  calcified and noncalcified atherosclerotic plaque contributes to  mild-to-moderate stenosis, with preserved opacification of the  vessels distally.      - Right Common Femoral Artery:  Mild stenosis is present due to semi  circumferential, predominantly noncalcified atherosclerotic plaque. -  Right Profunda Artery:  Multifocal areas of moderate to severe  stenosis due to calcified and noncalcified atherosclerotic plaque are  present in the proximal right profunda femoris artery, with preserved  opacification of the its more distal branches. - Right Superficial  Femoral Artery:  Multifocal areas of mild-to-moderate stenosis due to  calcified plaque are present throughout the proximal and mid right  superficial femoral artery, with area of focal high-grade stenosis  present within the vessel distally (series 401, image [id number]), with  preserved enhancement of the more distal segment. - Right Popliteal  Artery:  Mild stenosis is present in the right popliteal artery due  to calcified and noncalcified atherosclerotic plaque. - Right  Anterior Tibial, Posterior Tibial, Peroneal Arteries: Only the  proximal most segment of the anterior tibial artery demonstrates  opacification, with no definite enhancement identified in the distal  anterior tibial artery in the lower leg. Multifocal areas of moderate  to high-grade stenosis are present within the tibioperoneal trunk,  with the enhancement of the posterior tibial and peroneal arteries to  the ankle, although there are likely areas of mild-to-moderate  stenosis present in the both vessels due to calcified and  noncalcified atherosclerotic plaques..              LEFT LOWER EXTREMITY:      - Left Common Iliac Artery:  Mild stenosis is present in the left  common iliac artery due to semi circumferential plaque. - Left  External Iliac Artery:  No hemodynamically significant stenosis.  -  Left Internal Iliac Artery:  Focal moderate and high-grade stenosis  may be present in the mid and distal left internal iliac artery, with  some enhancement of the more distal branches.      - Left Common Femoral Artery:  Mild stenosis is present in the distal  left common femoral artery due to atherosclerotic plaque. - Left  Profunda Artery:  Moderate stenosis is present in the proximal left  profunda femoris artery due to calcified plaque. - Left Superficial  Femoral Artery:  Multifocal areas of mild to moderate stenosis are  present in the proximal and distal left superficial femoral artery,  with preserved enhancement of the vessels more distally. - Left  Popliteal Artery:  Mild-to-moderate stenosis is present in the left  popliteal artery due to calcified atherosclerotic plaque. There is  area of suspected high-grade stenosis in the vessel distally. - Left  Anterior Tibial, Posterior Tibial, Peroneal Arteries:  Examination of  the three-vessel runoff in the left calf is significantly limited due  to extensive vessel calcifications. There may be some faint  enhancement present in the distal posterior tibial artery and  anterior tibial artery, with no appreciable enhancement identified in  the distal left peroneal artery.                  ABDOMEN/PELVIS:      LIVER: No acute hepatic parenchymal abnormality is identified,  although the liver is poorly opacify due to contrast bolus timing.      BILE DUCTS: No intrahepatic or extrahepatic biliary dilatation is  present.      GALLBLADDER: Gallbladder is surgically absent.      PANCREAS: No pancreatic ductal dilatation or peripancreatic stranding  is present.      SPLEEN: Several round hypodense lesions are again visualized within  the spleen, similar to prior exam in October of 2022, possibly  representing epithelial cysts, but incompletely characterized on  current study.      No new abnormalities are identified in the spleen.      ADRENALS: Adrenal glands are  similar in appearance to prior exam  without evidence of acute abnormalities.      KIDNEYS, URETERS, BLADDER: Kidneys are symmetric in size and  enhancement without evidence of hydronephrosis or radiopaque  nephrolithiasis. Several small hypodensities in the cortex  bilaterally likely represent cysts.      Upper ureters are unremarkable in appearance.      Mild bladder wall thickening may be due to incomplete distention.      REPRODUCTIVE ORGANS: Uterus is present. No adnexal masses or fluid  collections are identified.      VESSELS: See above. No additional significant abnormality.      RETROPERITONEUM/LYMPH NODES: No acute retroperitoneal abnormality. No  enlarged lymph nodes.      BOWEL/PERITONEUM: Stomach is unremarkable in appearance. No  inflammatory bowel wall thickening or dilatation. Appendix is not  definitely identified, although no secondary signs of acute  appendicitis are present in the its expected location in the right  lower quadrant.      Small amount of free fluid/ascites is present in the abdomen. No free  air or thick-walled collections are identified.          MUSCULOSKELETAL/ABDOMINAL WALL: No acute osseous abnormality or  suspicious lesion. There is diffuse edema/fluid present in the  cutaneous tissues of the visualized lower chest, abdomen, pelvis and  lower extremities suggestive of 3rd spacing.      LOWER CHEST: Included lung bases are clear without evidence of  consolidation or sizable effusion.      There is mild 4 chamber cardiomegaly without sizable pericardial  effusion.      Distal esophagus is unremarkable in appearance.      Impression: VASCULAR:  1. Extensive atherosclerotic changes are present in the artery  supplying the right lower extremity, including absent opacification  of the right anterior tibial artery in the mid and distal right calf,  and areas of likely focal high-grade stenosis present within the  distal right superficial femoral artery and right profunda  femoris  artery.  2. Atherosclerotic plaques are present throughout the left lower  extremity, with areas of moderate and focal high-grade stenosis  present in the left superficial femoral and left popliteal arteries.  Faint enhancement is present in the distal anterior and posterior  tibial arteries in the lower calf, although no significant  enhancement is identified in the left peroneal artery.      NONVASCULAR:  1. Mild 4 chamber cardiomegaly, with diffuse edema/fluid present in  the cutaneous tissues of the visualized lower chest, abdomen, pelvis  and lower extremity and small amount of free fluid in the  abdomen/pelvis, suggestive of 3rd spacing. Correlate with fluid  volume status.  2. Mild bladder wall thickening may be due to incomplete distention,  although correlation with urinalysis to exclude any underlying  cystitis is recommended.  3. Several rounded hypodensities in the spleen are similar in  appearance to prior exam in October of 2022, likely representing  benign process, possibly epithelial cysts given the stability to  prior study, although incompletely characterized on current exam.      MACRO:  None.      Signed by: Fara Beach 6/24/2025 7:38 PM  Dictation workstation:   [id number]  XR ankle right 3+ views  Narrative: STUDY:  Right foot and ankle radiographs; 06/24/2025; 05:57PM.  INDICATION:  Pain.  COMPARISON:  None Available.  ACCESSION NUMBER(S):  [id number], [id number]  ORDERING CLINICIAN:  TECHNIQUE:  Three views of the right foot and four views of the right  ankle.  FINDINGS:    Right Foot:  There is no displaced fracture.  The alignment is  anatomic.  No soft tissue abnormality is seen. Mild degenerative  changes. Small heel spur.  Right Ankle:  There is no displaced fracture.  The alignment is  anatomic.  No soft tissue abnormality is seen. Mild degenerative  changes.  Impression: Mild degenerative changes.  No acute osseous abnormality.  Small heel spur.  Signed by Steev  MD Alex  XR foot right 3+ views  Narrative: STUDY:  Right foot and ankle radiographs; 06/24/2025; 05:57PM.  INDICATION:  Pain.  COMPARISON:  None Available.  ACCESSION NUMBER(S):  [id number], [id number]  ORDERING CLINICIAN:  TECHNIQUE:  Three views of the right foot and four views of the right  ankle.  FINDINGS:    Right Foot:  There is no displaced fracture.  The alignment is  anatomic.  No soft tissue abnormality is seen. Mild degenerative  changes. Small heel spur.  Right Ankle:  There is no displaced fracture.  The alignment is  anatomic.  No soft tissue abnormality is seen. Mild degenerative  changes.  Impression: Mild degenerative changes.  No acute osseous abnormality.  Small heel spur.  Signed by Steve Johnson MD  XR chest 2 views  Narrative: STUDY:  Chest Radiographs;  6/24/2025 at 5:57 PM.  INDICATION:  Cough.  COMPARISON:  XR chest 8/23/2024, 11/1/2022.  ACCESSION NUMBER(S):  [id number]  ORDERING CLINICIAN:  TERELL MAIER  TECHNIQUE:  Frontal and lateral chest.   FINDINGS:  CARDIOMEDIASTINAL SILHOUETTE:  Heart is enlarged with pulmonary vascular congestion.     LUNGS:  Lungs are clear.     ABDOMEN:  No remarkable upper abdominal findings.     BONES:  No acute osseous changes.  Impression: Cardiomegaly and pulmonary vascular congestion.  Signed by Steve Johnson MD  Lower extremity venous duplex right  Narrative: Interpreted By:  Marcial Gutierrez  and Bonifacio Monroe   STUDY:  [facility] US LOWER EXTREMITY VENOUS DUPLEX RIGHT;  6/24/2025 4:57 pm      INDICATION:  Signs/Symptoms:RLE pain, swelling.          COMPARISON:  None.      ACCESSION NUMBER(S):  [id number]      ORDERING CLINICIAN:  TERELL MAIER      TECHNIQUE:  Vascular ultrasound of the right lower extremity was performed.  Real-time compression views as well as Gray scale, color Doppler and  spectral Doppler waveform analysis was performed.      FINDINGS:  Evaluation of the visualized portions of the right common femoral  vein, proximal, mid, and  distal femoral vein, and popliteal vein were  performed.  Evaluation of the visualized portions of the  posterior  tibial and peroneal veins were also performed.      Limitations:  Body habitus and lower extremity edema      The evaluated veins demonstrate normal compressibility. There is  intact venous flow demonstrating normal respiratory variability and  normal augmentation of flow with calf compression. Therefore, there  is no ultrasonographic evidence for deep vein thrombosis within the  evaluated veins.      Impression: No sonographic evidence for deep vein thrombosis within the evaluated  veins of the right lower extremity.      I personally reviewed the images/study and I agree with the findings  as stated above by resident physician, Fer Valdovinos MD. This study  was interpreted at [place], [place].      MACRO:  None      Signed by: Marcial Gutierrez 6/24/2025 6:26 PM  Dictation workstation:   [id number]           [1] aspirin, 81 mg, oral, Daily  atorvastatin, 40 mg, oral, Nightly  bumetanide, 1 mg, oral, Daily  dapagliflozin propanediol, 10 mg, oral, Daily  doxycylcine, 100 mg, oral, BID  insulin lispro, 0-10 Units, subcutaneous, TID AC  magnesium oxide, 400 mg of magnesium oxide, oral, Daily  metoprolol succinate XL, 50 mg, oral, Daily  polyethylene glycol, 17 g, oral, Daily  sacubitriL-valsartan, 1 tablet, oral, BID  sennosides, 1 tablet, oral, Nightly  spironolactone, 12.5 mg, oral, Daily  [2] PRN medications: acetaminophen, dextrose, dextrose, glucagon, glucagon, heparin, melatonin, oxyCODONE  [3] heparin, 0-4,000 Units/hr, Last Rate: 1,000 Units/hr (06/25/25 9247)

## 2025-06-25 NOTE — CARE PLAN
The patient's goals for the shift include pain management    The clinical goals for the shift include patient will rate pain at or below a level of 4 out of 10 by the end of this shift.    Over the shift, the patient did not make progress toward the following goals.   Problem: Pain - Adult  Goal: Verbalizes/displays adequate comfort level or baseline comfort level  Outcome: Progressing     Problem: Safety - Adult  Goal: Free from fall injury  Outcome: Progressing     Problem: Discharge Planning  Goal: Discharge to home or other facility with appropriate resources  Outcome: Progressing     Problem: Chronic Conditions and Co-morbidities  Goal: Patient's chronic conditions and co-morbidity symptoms are monitored and maintained or improved  Outcome: Progressing     Problem: Nutrition  Goal: Nutrient intake appropriate for maintaining nutritional needs  Outcome: Progressing     Problem: Diabetes  Goal: Achieve decreasing blood glucose levels by end of shift  Outcome: Progressing  Goal: Increase stability of blood glucose readings by end of shift  Outcome: Progressing  Goal: Decrease in ketones present in urine by end of shift  Outcome: Progressing  Goal: Maintain electrolyte levels within acceptable range throughout shift  Outcome: Progressing  Goal: Maintain glucose levels >70mg/dl to <250mg/dl throughout shift  Outcome: Progressing  Goal: No changes in neurological exam by end of shift  Outcome: Progressing  Goal: Learn about and adhere to nutrition recommendations by end of shift  Outcome: Progressing  Goal: Vital signs within normal range for age by end of shift  Outcome: Progressing  Goal: Increase self care and/or family involovement by end of shift  Outcome: Progressing  Goal: Receive DSME education by end of shift  Outcome: Progressing     Problem: Pain  Goal: Takes deep breaths with improved pain control throughout the shift  Outcome: Progressing  Goal: Turns in bed with improved pain control throughout the  shift  Outcome: Progressing  Goal: Walks with improved pain control throughout the shift  Outcome: Progressing  Goal: Performs ADL's with improved pain control throughout shift  Outcome: Progressing  Goal: Participates in PT with improved pain control throughout the shift  Outcome: Progressing  Goal: Free from opioid side effects throughout the shift  Outcome: Progressing  Goal: Free from acute confusion related to pain meds throughout the shift  Outcome: Progressing     Problem: Fall/Injury  Goal: Not fall by end of shift  Outcome: Progressing  Goal: Be free from injury by end of the shift  Outcome: Progressing  Goal: Verbalize understanding of personal risk factors for fall in the hospital  Outcome: Progressing  Goal: Verbalize understanding of risk factor reduction measures to prevent injury from fall in the home  Outcome: Progressing  Goal: Use assistive devices by end of the shift  Outcome: Progressing  Goal: Pace activities to prevent fatigue by end of the shift  Outcome: Progressing

## 2025-06-25 NOTE — PROGRESS NOTES
06/25/25 1335   Rapid Rounds   Attendance Provider;Care Transitions   Expected Discharge Disposition Home H   Today we still await: Clinical stability     Pt admitted with RLE cellulitis-on PO abx, on a heparin drip. ADOD 2-3 days.

## 2025-06-25 NOTE — PROGRESS NOTES
"  VASCULAR SURGERY PROGRESS NOTE  Assessment/Plan   Eloy Li is 69 y.o. female with history of HTN, DM2, schizophrenia, HFrEF, CAD, afib s/p cardioversion 1/2025, known RLE stasis dermatitis who presents to ED due to 3 weeks of worsening RLE pain. Vascular surgery was consulted for further evaluation.   Patient with dopplerable signals in RLE, sensorimotor intact without evidence of tissue loss. CTA w/ runoff demonstrates atherosclerotic changes in LLE along with atherosclerotic changes in RLE in AT, as well as stenosis within R SFA and profunda. No evidence of acute occlusive disease. Patient with minimal symptoms in LLE at this time.     Plan:  Skin changes over RLE appear to be related to stasis dermatitis   Patient takes eliquis at home, would recommend transitioning to heparin gtt  Obtain GABRIELA/PVR - completed  Rest of care as per primary team  Vascular surgery will continue to follow     D/w attending, Dr. Harris.    Nicolasa Ferreira MD  PGY-2 Vascular Surgery Resident  [id number]    Subjective   No new symptoms this morning.     Objective   Vitals:  Heart Rate:  [75-90]   Temp:  [36.6 °C (97.9 °F)-37.1 °C (98.8 °F)]   Resp:  [8-20]   BP: (117-159)/()   Height:  [160 cm (5' 2.99\")]   Weight:  [98.2 kg (216 lb 7.9 oz)-98.2 kg (216 lb 8 oz)]   SpO2:  [94 %-100 %]     Exam:  Constitutional: No acute distress, resting comfortably  Neuro:  AOx3, grossly intact  ENMT: moist mucous membranes  Head/neck: atraumatic  CV: no tachycardia  Pulm: non-labored on room air  GI: soft, non-tender, non-distended  Skin: warm and dry  Musculoskeletal: moving all extremities  Extremities:   Palpable fems bilaterally   RLE with 2+ pitting edema and hyperpigmentation,   Strong monophasic R PT, DP  LLE w multiphasic PT, DP   Sensorimotor intact     Labs:  Results from last 7 days   Lab Units 06/25/25  0724 06/24/25  1528   WBC AUTO x10*3/uL 14.7* 11.3   HEMOGLOBIN g/dL 13.5 16.6*   PLATELETS AUTO x10*3/uL 203 241      Results " from last 7 days   Lab Units 06/25/25  0724 06/24/25  1528   SODIUM mmol/L 133* 134*   POTASSIUM mmol/L 4.2 4.4   CHLORIDE mmol/L 99 102   CO2 mmol/L 26 24   BUN mg/dL 18 14   CREATININE mg/dL 0.58 0.50   GLUCOSE mg/dL 124* 122*   MAGNESIUM mg/dL 1.75  --    PHOSPHORUS mg/dL 3.2  --       Results from last 7 days   Lab Units 06/24/25  1528   INR  1.5*   PROTIME seconds 16.7*   APTT seconds 27      Results from last 7 days   Lab Units 06/25/25  0725 06/25/25  0411 06/24/25  2320   ANTI XA UNFRACTIONATED IU/mL 0.4 0.5 0.4

## 2025-06-26 ENCOUNTER — PHARMACY VISIT (OUTPATIENT)
Dept: PHARMACY | Facility: CLINIC | Age: 70
End: 2025-06-26
Payer: COMMERCIAL

## 2025-06-26 VITALS
DIASTOLIC BLOOD PRESSURE: 101 MMHG | TEMPERATURE: 97.9 F | SYSTOLIC BLOOD PRESSURE: 151 MMHG | WEIGHT: 216.49 LBS | BODY MASS INDEX: 38.36 KG/M2 | HEIGHT: 63 IN | OXYGEN SATURATION: 97 % | HEART RATE: 83 BPM | RESPIRATION RATE: 16 BRPM

## 2025-06-26 DIAGNOSIS — I73.9 PERIPHERAL ARTERIAL DISEASE: Primary | ICD-10-CM

## 2025-06-26 LAB
ALBUMIN SERPL BCP-MCNC: 2.5 G/DL (ref 3.4–5)
ANION GAP SERPL CALC-SCNC: 10 MMOL/L (ref 10–20)
BACTERIA UR CULT: NORMAL
BUN SERPL-MCNC: 19 MG/DL (ref 6–23)
CALCIUM SERPL-MCNC: 8.2 MG/DL (ref 8.6–10.6)
CHLORIDE SERPL-SCNC: 98 MMOL/L (ref 98–107)
CO2 SERPL-SCNC: 29 MMOL/L (ref 21–32)
CREAT SERPL-MCNC: 0.7 MG/DL (ref 0.5–1.05)
EGFRCR SERPLBLD CKD-EPI 2021: >90 ML/MIN/1.73M*2
ERYTHROCYTE [DISTWIDTH] IN BLOOD BY AUTOMATED COUNT: 16.3 % (ref 11.5–14.5)
GLUCOSE BLD MANUAL STRIP-MCNC: 119 MG/DL (ref 74–99)
GLUCOSE BLD MANUAL STRIP-MCNC: 129 MG/DL (ref 74–99)
GLUCOSE BLD MANUAL STRIP-MCNC: 173 MG/DL (ref 74–99)
GLUCOSE BLD MANUAL STRIP-MCNC: 199 MG/DL (ref 74–99)
GLUCOSE SERPL-MCNC: 102 MG/DL (ref 74–99)
HCT VFR BLD AUTO: 43 % (ref 36–46)
HGB BLD-MCNC: 13.4 G/DL (ref 12–16)
MAGNESIUM SERPL-MCNC: 1.58 MG/DL (ref 1.6–2.4)
MCH RBC QN AUTO: 28.2 PG (ref 26–34)
MCHC RBC AUTO-ENTMCNC: 31.2 G/DL (ref 32–36)
MCV RBC AUTO: 90 FL (ref 80–100)
NRBC BLD-RTO: 0 /100 WBCS (ref 0–0)
PHOSPHATE SERPL-MCNC: 3.3 MG/DL (ref 2.5–4.9)
PLATELET # BLD AUTO: 172 X10*3/UL (ref 150–450)
POTASSIUM SERPL-SCNC: 4.3 MMOL/L (ref 3.5–5.3)
RBC # BLD AUTO: 4.76 X10*6/UL (ref 4–5.2)
SODIUM SERPL-SCNC: 133 MMOL/L (ref 136–145)
UFH PPP CHRO-ACNC: 0.1 IU/ML (ref ?–1.1)
WBC # BLD AUTO: 12.1 X10*3/UL (ref 4.4–11.3)

## 2025-06-26 PROCEDURE — RXMED WILLOW AMBULATORY MEDICATION CHARGE

## 2025-06-26 PROCEDURE — 2500000001 HC RX 250 WO HCPCS SELF ADMINISTERED DRUGS (ALT 637 FOR MEDICARE OP): Mod: SE | Performed by: STUDENT IN AN ORGANIZED HEALTH CARE EDUCATION/TRAINING PROGRAM

## 2025-06-26 PROCEDURE — 85520 HEPARIN ASSAY: CPT | Performed by: NURSE PRACTITIONER

## 2025-06-26 PROCEDURE — 85027 COMPLETE CBC AUTOMATED: CPT | Performed by: STUDENT IN AN ORGANIZED HEALTH CARE EDUCATION/TRAINING PROGRAM

## 2025-06-26 PROCEDURE — 2500000002 HC RX 250 W HCPCS SELF ADMINISTERED DRUGS (ALT 637 FOR MEDICARE OP, ALT 636 FOR OP/ED): Mod: SE | Performed by: STUDENT IN AN ORGANIZED HEALTH CARE EDUCATION/TRAINING PROGRAM

## 2025-06-26 PROCEDURE — 2500000004 HC RX 250 GENERAL PHARMACY W/ HCPCS (ALT 636 FOR OP/ED): Mod: SE | Performed by: NURSE PRACTITIONER

## 2025-06-26 PROCEDURE — 80069 RENAL FUNCTION PANEL: CPT | Performed by: STUDENT IN AN ORGANIZED HEALTH CARE EDUCATION/TRAINING PROGRAM

## 2025-06-26 PROCEDURE — 2500000001 HC RX 250 WO HCPCS SELF ADMINISTERED DRUGS (ALT 637 FOR MEDICARE OP): Mod: SE | Performed by: NURSE PRACTITIONER

## 2025-06-26 PROCEDURE — 36415 COLL VENOUS BLD VENIPUNCTURE: CPT | Performed by: NURSE PRACTITIONER

## 2025-06-26 PROCEDURE — 1100000001 HC PRIVATE ROOM DAILY

## 2025-06-26 PROCEDURE — 99239 HOSP IP/OBS DSCHRG MGMT >30: CPT | Performed by: STUDENT IN AN ORGANIZED HEALTH CARE EDUCATION/TRAINING PROGRAM

## 2025-06-26 PROCEDURE — 2500000002 HC RX 250 W HCPCS SELF ADMINISTERED DRUGS (ALT 637 FOR MEDICARE OP, ALT 636 FOR OP/ED): Mod: SE | Performed by: NURSE PRACTITIONER

## 2025-06-26 PROCEDURE — 82947 ASSAY GLUCOSE BLOOD QUANT: CPT

## 2025-06-26 PROCEDURE — 83735 ASSAY OF MAGNESIUM: CPT | Performed by: STUDENT IN AN ORGANIZED HEALTH CARE EDUCATION/TRAINING PROGRAM

## 2025-06-26 RX ORDER — PREGABALIN 75 MG/1
75 CAPSULE ORAL 2 TIMES DAILY
Status: DISCONTINUED | OUTPATIENT
Start: 2025-06-26 | End: 2025-06-27 | Stop reason: HOSPADM

## 2025-06-26 RX ORDER — OXYCODONE HYDROCHLORIDE 5 MG/1
5 TABLET ORAL EVERY 6 HOURS PRN
Qty: 20 TABLET | Refills: 0 | Status: SHIPPED | OUTPATIENT
Start: 2025-06-26 | End: 2025-07-01

## 2025-06-26 RX ORDER — PREGABALIN 75 MG/1
75 CAPSULE ORAL 2 TIMES DAILY
Qty: 60 CAPSULE | Refills: 0 | Status: SHIPPED | OUTPATIENT
Start: 2025-06-26 | End: 2025-07-26

## 2025-06-26 RX ORDER — LANOLIN ALCOHOL/MO/W.PET/CERES
400 CREAM (GRAM) TOPICAL DAILY
Status: DISCONTINUED | OUTPATIENT
Start: 2025-06-26 | End: 2025-06-26

## 2025-06-26 RX ORDER — DULOXETIN HYDROCHLORIDE 60 MG/1
60 CAPSULE, DELAYED RELEASE ORAL DAILY
Qty: 90 CAPSULE | Refills: 0 | Status: SHIPPED | OUTPATIENT
Start: 2025-06-27 | End: 2025-09-25

## 2025-06-26 RX ORDER — DULOXETIN HYDROCHLORIDE 60 MG/1
60 CAPSULE, DELAYED RELEASE ORAL DAILY
Status: DISCONTINUED | OUTPATIENT
Start: 2025-06-26 | End: 2025-06-27 | Stop reason: HOSPADM

## 2025-06-26 RX ORDER — BUMETANIDE 1 MG/1
1 TABLET ORAL 3 TIMES WEEKLY
Qty: 12 TABLET | Refills: 0 | Status: SHIPPED | OUTPATIENT
Start: 2025-06-27 | End: 2025-07-27

## 2025-06-26 RX ORDER — DOXYCYCLINE 100 MG/1
100 CAPSULE ORAL 2 TIMES DAILY
Qty: 16 CAPSULE | Refills: 0 | Status: SHIPPED | OUTPATIENT
Start: 2025-06-26 | End: 2025-07-04

## 2025-06-26 RX ADMIN — DOXYCYCLINE HYCLATE 100 MG: 100 TABLET, FILM COATED ORAL at 09:02

## 2025-06-26 RX ADMIN — DAPAGLIFLOZIN 10 MG: 10 TABLET, FILM COATED ORAL at 09:02

## 2025-06-26 RX ADMIN — BUMETANIDE 1 MG: 1 TABLET ORAL at 09:03

## 2025-06-26 RX ADMIN — MAGNESIUM OXIDE TAB 400 MG (241.3 MG ELEMENTAL MG) 1 TABLET: 400 (241.3 MG) TAB at 09:02

## 2025-06-26 RX ADMIN — PREGABALIN 75 MG: 75 CAPSULE ORAL at 21:58

## 2025-06-26 RX ADMIN — PREGABALIN 75 MG: 75 CAPSULE ORAL at 12:20

## 2025-06-26 RX ADMIN — ASPIRIN 81 MG: 81 TABLET, COATED ORAL at 09:00

## 2025-06-26 RX ADMIN — SPIRONOLACTONE 12.5 MG: 25 TABLET, FILM COATED ORAL at 09:02

## 2025-06-26 RX ADMIN — APIXABAN 5 MG: 5 TABLET, FILM COATED ORAL at 21:58

## 2025-06-26 RX ADMIN — DOXYCYCLINE HYCLATE 100 MG: 100 TABLET, FILM COATED ORAL at 21:58

## 2025-06-26 RX ADMIN — SENNOSIDES 8.6 MG: 8.6 TABLET, FILM COATED ORAL at 21:58

## 2025-06-26 RX ADMIN — ATORVASTATIN CALCIUM 40 MG: 40 TABLET, FILM COATED ORAL at 21:58

## 2025-06-26 RX ADMIN — OXYCODONE 5 MG: 5 TABLET ORAL at 23:08

## 2025-06-26 RX ADMIN — OXYCODONE 5 MG: 5 TABLET ORAL at 09:23

## 2025-06-26 RX ADMIN — SACUBITRIL AND VALSARTAN 1 TABLET: 24; 26 TABLET, FILM COATED ORAL at 09:02

## 2025-06-26 RX ADMIN — DULOXETINE 60 MG: 60 CAPSULE, DELAYED RELEASE ORAL at 12:20

## 2025-06-26 RX ADMIN — APIXABAN 5 MG: 5 TABLET, FILM COATED ORAL at 14:51

## 2025-06-26 RX ADMIN — METOPROLOL SUCCINATE 50 MG: 50 TABLET, EXTENDED RELEASE ORAL at 09:02

## 2025-06-26 ASSESSMENT — PAIN - FUNCTIONAL ASSESSMENT
PAIN_FUNCTIONAL_ASSESSMENT: 0-10

## 2025-06-26 ASSESSMENT — PAIN SCALES - GENERAL
PAINLEVEL_OUTOF10: 8
PAINLEVEL_OUTOF10: 5 - MODERATE PAIN
PAINLEVEL_OUTOF10: 0 - NO PAIN
PAINLEVEL_OUTOF10: 10 - WORST POSSIBLE PAIN

## 2025-06-26 ASSESSMENT — PAIN DESCRIPTION - LOCATION: LOCATION: FOOT

## 2025-06-26 ASSESSMENT — PAIN DESCRIPTION - ORIENTATION: ORIENTATION: RIGHT;LEFT

## 2025-06-26 NOTE — CARE PLAN
Problem: Pain - Adult  Goal: Verbalizes/displays adequate comfort level or baseline comfort level  Outcome: Progressing     Problem: Safety - Adult  Goal: Free from fall injury  Outcome: Progressing     Problem: Discharge Planning  Goal: Discharge to home or other facility with appropriate resources  Outcome: Progressing     Problem: Chronic Conditions and Co-morbidities  Goal: Patient's chronic conditions and co-morbidity symptoms are monitored and maintained or improved  Outcome: Progressing     Problem: Nutrition  Goal: Nutrient intake appropriate for maintaining nutritional needs  Outcome: Progressing     Problem: Diabetes  Goal: Achieve decreasing blood glucose levels by end of shift  Outcome: Progressing  Goal: Increase stability of blood glucose readings by end of shift  Outcome: Progressing  Goal: Decrease in ketones present in urine by end of shift  Outcome: Progressing  Goal: Maintain electrolyte levels within acceptable range throughout shift  Outcome: Progressing  Goal: Maintain glucose levels >70mg/dl to <250mg/dl throughout shift  Outcome: Progressing  Goal: No changes in neurological exam by end of shift  Outcome: Progressing  Goal: Learn about and adhere to nutrition recommendations by end of shift  Outcome: Progressing  Goal: Vital signs within normal range for age by end of shift  Outcome: Progressing  Goal: Increase self care and/or family involovement by end of shift  Outcome: Progressing  Goal: Receive DSME education by end of shift  Outcome: Progressing   The patient's goals for the shift include pain management    The clinical goals for the shift include Patient will have no c/o of pain or rate pain ,4 this shift         This may improve off alcohol  Try Ensure shakes - they are better chilled.

## 2025-06-26 NOTE — PROGRESS NOTES
06/26/25 1356   Rapid Rounds   Attendance Provider;Care Transitions   Expected Discharge Disposition Home H     TCC spoke to Violette Triplett CM and made aware plan for DC today and that care team reccs HC PT/OT. Per Violette, email MAR and DC summary to her at [email or web address].   1335-TCC met with pt and pt states she is in agreement with [facility] PT/OT on DC and confirmed she will need transport home. TCC updated attending and pt's RN and will request a transport time.   1429-Transport confirmed via [facility] ([phone]) for 1800 home. TCC updated attending, pt's RN, and resource RN.   1433-TCC emailed MAR/AVS/DC Summary to pt's Violette Triplett CM.   1525-TCC spoke to pt's son, Ambrose and updated him on transport time and he confirmed he will be home.

## 2025-06-26 NOTE — PROGRESS NOTES
VASCULAR SURGERY PROGRESS NOTE  Assessment/Plan   Eloy Li is 69 y.o. female with history of HTN, DM2, schizophrenia, HFrEF, CAD, afib s/p cardioversion 1/2025, known RLE stasis dermatitis who presents to ED due to 3 weeks of worsening RLE pain. Vascular surgery was consulted for further evaluation.   Patient with dopplerable signals in RLE, sensorimotor intact without evidence of tissue loss. CTA w/ runoff demonstrates atherosclerotic changes in LLE along with atherosclerotic changes in RLE in AT, as well as stenosis within R SFA and profunda. No evidence of acute occlusive disease.     ABIs reviewed, while there is PAD she has adequate inflow and ENEIDA of 0.7. Suspicion there is component of neuropathic pain as pain is bilateral.     Plan:  Skin changes over RLE appear to be related to stasis dermatitis   Recommend initiating neuropathic pain meds while in patient and on discharge  Rest of care as per primary team    We will schedule follow up in 3 months with repeat vasc studies. Please call/page with further questions.     D/w attending, Dr. Harris.    Nicolasa Ferreira MD  PGY-2 Vascular Surgery Resident  [id number]    Subjective   Continues to have burning/numbness in bottom of feet. No new symptoms.     Objective   Vitals:  Heart Rate:  [75-83]   Temp:  [36.3 °C (97.3 °F)-37.1 °C (98.8 °F)]   Resp:  [16-19]   BP: (102-140)/(67-86)   SpO2:  [95 %-100 %]     Exam:  Constitutional: No acute distress, resting comfortably  Neuro:  AOx3, grossly intact  ENMT: moist mucous membranes  Head/neck: atraumatic  CV: no tachycardia  Pulm: non-labored on room air  GI: soft, non-tender, non-distended  Skin: warm and dry  Musculoskeletal: moving all extremities  Extremities:   Palpable fems bilaterally   RLE with 2+ pitting edema and hyperpigmentation,   Strong monophasic/biphasic R PT, DP  LLE w multiphasic PT, DP   Sensorimotor intact     Labs:  Results from last 7 days   Lab Units 06/26/25  0756 06/25/25  7766  06/24/25  1528   WBC AUTO x10*3/uL 12.1* 14.7* 11.3   HEMOGLOBIN g/dL 13.4 13.5 16.6*   PLATELETS AUTO x10*3/uL 172 203 241      Results from last 7 days   Lab Units 06/26/25  0756 06/25/25  0724 06/24/25  1528 06/24/25  1528   SODIUM mmol/L 133* 133*  --  134*   POTASSIUM mmol/L 4.3 4.2  --  4.4   CHLORIDE mmol/L 98 99  --  102   CO2 mmol/L 29 26  --  24   BUN mg/dL 19 18  --  14   CREATININE mg/dL 0.70 0.58  --  0.50   GLUCOSE mg/dL 102* 124*  --  122*   MAGNESIUM mg/dL 1.58* 1.75   < >  --    PHOSPHORUS mg/dL 3.3 3.2   < >  --     < > = values in this interval not displayed.      Results from last 7 days   Lab Units 06/24/25  1528   INR  1.5*   PROTIME seconds 16.7*   APTT seconds 27      Results from last 7 days   Lab Units 06/26/25  0756 06/25/25  0725 06/25/25  0411   ANTI XA UNFRACTIONATED IU/mL 0.1 0.4 0.5

## 2025-06-26 NOTE — DISCHARGE SUMMARY
"Discharge Diagnosis  Venous stasis dermatitis of right lower extremity    Issues Requiring Follow-Up  PCP follow-up, vascular surgery follow-up in 3 months    Test Results Pending At Discharge  Pending Labs       No current pending labs.            Hospital Course  Eloy Li is a 69 year old female with a PMH of HFrEF (last EF 30-35% 8/24/24), gallstone pancreatitis, HLD, Afib, T2DM, HTN, and schizophrenia. Pt presented to ED for further evaluation of swelling, pain, and numbness in her right foot that radiates up her right leg. She rates pain as 10/10 in right foot/leg. She has also experienced difficulty with ambulation due to leg pain. Pt admitted to medicine service for further treatment of venous stasis dermatitis of RLE and PT/OT consult.     Venous stasis dermatitis of RLE  Diabetic Neuropathy of BLE  - vascular surgery consulted while in ED d/t RLE atherosclerotic changes and claudication (see consult note)  - c/w low intensity Heparin gtt initiated while in the ED  - Per vascular surgery team: \"vascular ENEIDA reviewed, while there is PAD she has adequate inflow and ENEIDA of 0.7. Suspicion there is component of neuropathic pain as pain is bilateral\"  - Start Cymbalta 60 mg and Lyrica 75 mg BID. She will likely need dosage adjustments in the following weeks.     RLE cellulitis  - ESR 22 and CRP 8.38  - low suspicion for cellulitis,continue to monitor RLE for progression   - Doxycycline 100 mg PO BID x10 days     Unstable gait  Deconditioning   - PT/OT consulted, they recommend low intensity level of continued care. Patient will benefit from continued PT/OT. Orders have been placed for her therapy upon discharge.     HFrEF (last EF 30-35% 8/24/24)  HTN  BLE edema  Elevated BNP  - last /104  -  on admit  - continue to monitor BP  - DW, I&O  - previously prescribed home regimen: Entresto 24/26 mg PO BID, Aldactone 12.5 mg PO every day, Bumex 1 mg PO weekly (Sunday), Farxiga 10 mg PO every day, and " Toprol XL 50 mg PO every day  - resume home regimen with the exception of changing Bumex to 1 mg PO 3x weekly     Afib  - Resume home dose of Eliquis   - s/p failed DCCV 2022 and successful cardioversion in 1/2025  - resume dose of Toprol XL 50 mg PO every day     T2DM (last HgA1c 7.6% 1/24/25)  - resume home dose of Farxiga 10 mg PO every day  - accucheck ACHS  - Lispro SSI 0-10 unit      > 30min in discharge coordination of care which includes: discharge planning, medication reconciliation, arranging appropriate follow up and discussion of discharge instructions with the patient.      Visit Vitals  BP (!) 140/92 (Patient Position: Lying) Comment: nurse notified   Pulse 77   Temp 36.6 °C (97.9 °F)   Resp 16     Vitals:    06/25/25 0600   Weight: 98.2 kg (216 lb 7.9 oz)       Immunization History   Administered Date(s) Administered    Moderna SARS-CoV-2 Vaccination 01/06/2022    Pfizer Gray Cap SARS-CoV-2 11/11/2022       Pertinent Physical Exam At Time of Discharge  Physical Exam  Vitals and nursing note reviewed.   Constitutional:       General: She is not in acute distress.     Appearance: Normal appearance. She is not ill-appearing.   HENT:      Head: Normocephalic and atraumatic.      Mouth/Throat:      Mouth: Mucous membranes are moist.   Eyes:      Extraocular Movements: Extraocular movements intact.      Conjunctiva/sclera: Conjunctivae normal.   Cardiovascular:      Rate and Rhythm: Normal rate and regular rhythm.      Pulses: Normal pulses.      Heart sounds: Normal heart sounds.   Pulmonary:      Effort: Pulmonary effort is normal.      Breath sounds: Normal breath sounds.   Abdominal:      General: Abdomen is flat. Bowel sounds are normal.      Palpations: Abdomen is soft.   Musculoskeletal:      Right lower leg: Edema present.      Left lower leg: Edema present.      Comments: Palpable fems bilaterally   RLE with 2+ pitting edema and hyperpigmentation,   Strong monophasic/biphasic R PT, DP  LLE w  multiphasic PT, DP   Sensorimotor intact      Skin:     General: Skin is warm.   Neurological:      General: No focal deficit present.      Mental Status: She is alert. Mental status is at baseline.   Psychiatric:         Mood and Affect: Mood normal.         Behavior: Behavior normal.         Thought Content: Thought content normal.         Home Medications     Medication List      START taking these medications     doxycycline 100 mg capsule; Commonly known as: Vibramycin; Take 1   capsule (100 mg) by mouth 2 times a day for 16 doses. Take with a full   glass of water and do not lie down for at least 30 minutes after.;   Replaces: doxycycline 100 mg tablet   DULoxetine 60 mg DR capsule; Commonly known as: Cymbalta; Take 1 capsule   (60 mg) by mouth once daily. Do not crush or chew.; Start taking on: June 27, 2025   oxyCODONE 5 mg immediate release tablet; Commonly known as: Roxicodone;   Take 1 tablet (5 mg) by mouth every 6 hours if needed for severe pain (7 -   10) for up to 5 days.   pregabalin 75 mg capsule; Commonly known as: Lyrica; Take 1 capsule (75   mg) by mouth 2 times a day.     CHANGE how you take these medications     bumetanide 1 mg tablet; Commonly known as: Bumex; Take 1 tablet (1 mg)   by mouth 3 times a week.; Start taking on: June 27, 2025; What changed:   when to take this     CONTINUE taking these medications     acetaminophen 325 mg tablet; Commonly known as: Tylenol   apixaban 5 mg tablet; Commonly known as: Eliquis   aspirin 81 mg EC tablet; Take 1 tablet (81 mg) by mouth once daily.   atorvastatin 80 mg tablet; Commonly known as: Lipitor   dapagliflozin propanediol 10 mg tablet; Commonly known as: Farxiga   dulaglutide 1.5 mg/0.5 mL pen injector injection; Commonly known as:   Trulicity   Entresto 24-26 mg tablet; Generic drug: sacubitriL-valsartan   magnesium oxide 400 mg tablet; Commonly known as: Mag-Ox   metoprolol succinate XL 50 mg 24 hr tablet; Commonly known as: Toprol-XL    polyethylene glycol 17 gram packet; Commonly known as: Glycolax, Miralax   sennosides 8.6 mg tablet; Commonly known as: Senokot   spironolactone 25 mg tablet; Commonly known as: Aldactone     STOP taking these medications     doxycycline 100 mg tablet; Commonly known as: Vibra-Tabs; Replaced by:   doxycycline 100 mg capsule       Outpatient Follow-Up  Future Appointments   Date Time Provider Department Center   9/17/2025  9:00 AM [facility] 4 [id number] [facility]   9/17/2025 10:00 AM Gareth Ware MD [id number] [facility]       Woody Alvarez DO

## 2025-06-26 NOTE — CARE PLAN
The patient's goals for the shift include pain management    The clinical goals for the shift include Patient will be discharged to home    Stopped Heparin gtt today re-started on home Elliquis. Started  on Lyrica and Cymbalta for pain control . Tolerated well. Written for discharge to home with [facility]HC/PT/OT. Received meds to bed   Transport Set up for 1800.

## 2025-06-27 NOTE — CARE PLAN
The patient's goals for the shift include pain management    The clinical goals for the shift include Patient will remain free from falls and/or injury until discharge home      Problem: Fall/Injury  Goal: Not fall by end of shift  Outcome: Progressing  Goal: Be free from injury by end of the shift  Outcome: Progressing  Goal: Verbalize understanding of personal risk factors for fall in the hospital  Outcome: Progressing  Goal: Verbalize understanding of risk factor reduction measures to prevent injury from fall in the home  Outcome: Progressing  Goal: Use assistive devices by end of the shift  Outcome: Progressing  Goal: Pace activities to prevent fatigue by end of the shift  Outcome: Progressing

## 2025-07-14 ENCOUNTER — APPOINTMENT (OUTPATIENT)
Dept: VASCULAR SURGERY | Facility: HOSPITAL | Age: 70
End: 2025-07-14
Payer: MEDICARE

## 2025-09-17 ENCOUNTER — APPOINTMENT (OUTPATIENT)
Dept: VASCULAR SURGERY | Facility: HOSPITAL | Age: 70
End: 2025-09-17
Payer: MEDICARE

## 2025-09-17 ENCOUNTER — APPOINTMENT (OUTPATIENT)
Dept: VASCULAR MEDICINE | Facility: HOSPITAL | Age: 70
End: 2025-09-17
Payer: MEDICARE